# Patient Record
Sex: MALE | Race: WHITE | Employment: OTHER
[De-identification: names, ages, dates, MRNs, and addresses within clinical notes are randomized per-mention and may not be internally consistent; named-entity substitution may affect disease eponyms.]

---

## 2017-01-01 ENCOUNTER — HOME CARE VISIT (OUTPATIENT)
Dept: SCHEDULING | Facility: HOME HEALTH | Age: 82
End: 2017-01-01
Payer: MEDICARE

## 2017-01-01 ENCOUNTER — HOME CARE VISIT (OUTPATIENT)
Dept: HOSPICE | Facility: HOSPICE | Age: 82
End: 2017-01-01
Payer: MEDICARE

## 2017-01-01 ENCOUNTER — HOSPICE ADMISSION (OUTPATIENT)
Dept: HOSPICE | Facility: HOSPICE | Age: 82
End: 2017-01-01
Payer: MEDICARE

## 2017-01-01 ENCOUNTER — HOSPITAL ENCOUNTER (INPATIENT)
Age: 82
LOS: 6 days | Discharge: HOSPICE | DRG: 722 | End: 2017-10-12
Attending: EMERGENCY MEDICINE | Admitting: HOSPITALIST
Payer: MEDICARE

## 2017-01-01 ENCOUNTER — HOSPITAL ENCOUNTER (EMERGENCY)
Age: 82
Discharge: HOME | DRG: 722 | End: 2017-10-04
Attending: EMERGENCY MEDICINE
Payer: MEDICARE

## 2017-01-01 ENCOUNTER — HOSPITAL ENCOUNTER (EMERGENCY)
Age: 82
Discharge: HOME | DRG: 722 | End: 2017-10-05
Attending: EMERGENCY MEDICINE
Payer: MEDICARE

## 2017-01-01 ENCOUNTER — APPOINTMENT (OUTPATIENT)
Dept: CT IMAGING | Age: 82
DRG: 722 | End: 2017-01-01
Attending: EMERGENCY MEDICINE
Payer: MEDICARE

## 2017-01-01 VITALS
HEIGHT: 62 IN | BODY MASS INDEX: 26.5 KG/M2 | RESPIRATION RATE: 14 BRPM | DIASTOLIC BLOOD PRESSURE: 63 MMHG | RESPIRATION RATE: 16 BRPM | WEIGHT: 144 LBS | TEMPERATURE: 97.3 F | SYSTOLIC BLOOD PRESSURE: 117 MMHG | HEART RATE: 86 BPM | HEART RATE: 80 BPM | DIASTOLIC BLOOD PRESSURE: 64 MMHG | TEMPERATURE: 98.7 F | OXYGEN SATURATION: 96 % | SYSTOLIC BLOOD PRESSURE: 106 MMHG

## 2017-01-01 VITALS
HEART RATE: 78 BPM | RESPIRATION RATE: 16 BRPM | HEIGHT: 63 IN | TEMPERATURE: 98.6 F | BODY MASS INDEX: 28.35 KG/M2 | WEIGHT: 160 LBS | OXYGEN SATURATION: 100 % | SYSTOLIC BLOOD PRESSURE: 149 MMHG | DIASTOLIC BLOOD PRESSURE: 77 MMHG

## 2017-01-01 VITALS
DIASTOLIC BLOOD PRESSURE: 74 MMHG | TEMPERATURE: 98 F | SYSTOLIC BLOOD PRESSURE: 148 MMHG | RESPIRATION RATE: 16 BRPM | BODY MASS INDEX: 29.58 KG/M2 | OXYGEN SATURATION: 100 % | WEIGHT: 167 LBS | HEART RATE: 61 BPM

## 2017-01-01 VITALS
OXYGEN SATURATION: 95 % | WEIGHT: 144.62 LBS | HEART RATE: 56 BPM | TEMPERATURE: 98.1 F | HEIGHT: 62 IN | SYSTOLIC BLOOD PRESSURE: 130 MMHG | BODY MASS INDEX: 26.61 KG/M2 | RESPIRATION RATE: 15 BRPM | DIASTOLIC BLOOD PRESSURE: 72 MMHG

## 2017-01-01 VITALS
HEART RATE: 68 BPM | SYSTOLIC BLOOD PRESSURE: 122 MMHG | DIASTOLIC BLOOD PRESSURE: 64 MMHG | TEMPERATURE: 98 F | OXYGEN SATURATION: 98 % | RESPIRATION RATE: 20 BRPM

## 2017-01-01 DIAGNOSIS — R31.0 GROSS HEMATURIA: Primary | ICD-10-CM

## 2017-01-01 DIAGNOSIS — C61 PROSTATE CANCER (HCC): ICD-10-CM

## 2017-01-01 DIAGNOSIS — R41.82 ALTERED MENTAL STATUS, UNSPECIFIED ALTERED MENTAL STATUS TYPE: ICD-10-CM

## 2017-01-01 DIAGNOSIS — N40.2 PROSTATE NODULE: ICD-10-CM

## 2017-01-01 DIAGNOSIS — R31.9 HEMATURIA, UNSPECIFIED TYPE: ICD-10-CM

## 2017-01-01 DIAGNOSIS — T83.098A OBSTRUCTION OF URINARY CATHETER, INITIAL ENCOUNTER (HCC): Primary | ICD-10-CM

## 2017-01-01 DIAGNOSIS — R53.81 DEBILITY: ICD-10-CM

## 2017-01-01 LAB
ABO + RH BLD: NORMAL
ALBUMIN SERPL-MCNC: 2.8 G/DL (ref 3.4–5)
ALBUMIN SERPL-MCNC: 2.9 G/DL (ref 3.4–5)
ALBUMIN SERPL-MCNC: 3 G/DL (ref 3.4–5)
ALBUMIN SERPL-MCNC: 3.5 G/DL (ref 3.4–5)
ALBUMIN/GLOB SERPL: 0.7 {RATIO} (ref 0.8–1.7)
ALBUMIN/GLOB SERPL: 0.8 {RATIO} (ref 0.8–1.7)
ALBUMIN/GLOB SERPL: 1 {RATIO} (ref 0.8–1.7)
ALBUMIN/GLOB SERPL: 1 {RATIO} (ref 0.8–1.7)
ALP SERPL-CCNC: 58 U/L (ref 45–117)
ALP SERPL-CCNC: 65 U/L (ref 45–117)
ALP SERPL-CCNC: 67 U/L (ref 45–117)
ALP SERPL-CCNC: 73 U/L (ref 45–117)
ALT SERPL-CCNC: 12 U/L (ref 16–61)
ALT SERPL-CCNC: 14 U/L (ref 16–61)
ALT SERPL-CCNC: 20 U/L (ref 16–61)
ALT SERPL-CCNC: 22 U/L (ref 16–61)
ANION GAP BLD CALC-SCNC: 13 MMOL/L (ref 10–20)
ANION GAP SERPL CALC-SCNC: 11 MMOL/L (ref 3–18)
ANION GAP SERPL CALC-SCNC: 13 MMOL/L (ref 3–18)
ANION GAP SERPL CALC-SCNC: 7 MMOL/L (ref 3–18)
ANION GAP SERPL CALC-SCNC: 8 MMOL/L (ref 3–18)
ANION GAP SERPL CALC-SCNC: 8 MMOL/L (ref 3–18)
ANION GAP SERPL CALC-SCNC: 9 MMOL/L (ref 3–18)
APPEARANCE UR: ABNORMAL
APPEARANCE UR: ABNORMAL
APPEARANCE UR: CLEAR
APTT PPP: 29.8 SEC (ref 23–36.4)
APTT PPP: 30.3 SEC (ref 23–36.4)
AST SERPL-CCNC: 18 U/L (ref 15–37)
AST SERPL-CCNC: 20 U/L (ref 15–37)
AST SERPL-CCNC: 29 U/L (ref 15–37)
AST SERPL-CCNC: 33 U/L (ref 15–37)
ATRIAL RATE: 69 BPM
BACTERIA SPEC CULT: NORMAL
BACTERIA SPEC CULT: NORMAL
BACTERIA URNS QL MICRO: ABNORMAL /HPF
BASOPHILS # BLD: 0 K/UL (ref 0–0.06)
BASOPHILS NFR BLD: 0 % (ref 0–2)
BILIRUB SERPL-MCNC: 1 MG/DL (ref 0.2–1)
BILIRUB SERPL-MCNC: 1 MG/DL (ref 0.2–1)
BILIRUB SERPL-MCNC: 1.1 MG/DL (ref 0.2–1)
BILIRUB SERPL-MCNC: 1.4 MG/DL (ref 0.2–1)
BILIRUB UR QL: ABNORMAL
BILIRUB UR QL: NEGATIVE
BILIRUB UR QL: NEGATIVE
BLOOD GROUP ANTIBODIES SERPL: NORMAL
BUN BLD-MCNC: 27 MG/DL (ref 7–18)
BUN SERPL-MCNC: 21 MG/DL (ref 7–18)
BUN SERPL-MCNC: 25 MG/DL (ref 7–18)
BUN SERPL-MCNC: 27 MG/DL (ref 7–18)
BUN SERPL-MCNC: 28 MG/DL (ref 7–18)
BUN SERPL-MCNC: 31 MG/DL (ref 7–18)
BUN SERPL-MCNC: 34 MG/DL (ref 7–18)
BUN/CREAT SERPL: 21 (ref 12–20)
BUN/CREAT SERPL: 23 (ref 12–20)
BUN/CREAT SERPL: 25 (ref 12–20)
BUN/CREAT SERPL: 26 (ref 12–20)
BUN/CREAT SERPL: 28 (ref 12–20)
BUN/CREAT SERPL: 29 (ref 12–20)
CA-I BLD-MCNC: 1.12 MMOL/L (ref 1.12–1.32)
CALCIUM SERPL-MCNC: 8.4 MG/DL (ref 8.5–10.1)
CALCIUM SERPL-MCNC: 8.5 MG/DL (ref 8.5–10.1)
CALCIUM SERPL-MCNC: 8.6 MG/DL (ref 8.5–10.1)
CALCIUM SERPL-MCNC: 8.7 MG/DL (ref 8.5–10.1)
CALCIUM SERPL-MCNC: 8.8 MG/DL (ref 8.5–10.1)
CALCIUM SERPL-MCNC: 8.9 MG/DL (ref 8.5–10.1)
CALCULATED P AXIS, ECG09: 54 DEGREES
CALCULATED R AXIS, ECG10: 4 DEGREES
CALCULATED T AXIS, ECG11: 16 DEGREES
CHLORIDE BLD-SCNC: 108 MMOL/L (ref 100–108)
CHLORIDE SERPL-SCNC: 105 MMOL/L (ref 100–108)
CHLORIDE SERPL-SCNC: 105 MMOL/L (ref 100–108)
CHLORIDE SERPL-SCNC: 106 MMOL/L (ref 100–108)
CHLORIDE SERPL-SCNC: 108 MMOL/L (ref 100–108)
CO2 BLD-SCNC: 22 MMOL/L (ref 19–24)
CO2 SERPL-SCNC: 17 MMOL/L (ref 21–32)
CO2 SERPL-SCNC: 21 MMOL/L (ref 21–32)
CO2 SERPL-SCNC: 21 MMOL/L (ref 21–32)
CO2 SERPL-SCNC: 22 MMOL/L (ref 21–32)
CO2 SERPL-SCNC: 23 MMOL/L (ref 21–32)
CO2 SERPL-SCNC: 23 MMOL/L (ref 21–32)
COLOR UR: ABNORMAL
COLOR UR: ABNORMAL
COLOR UR: YELLOW
CREAT SERPL-MCNC: 0.82 MG/DL (ref 0.6–1.3)
CREAT SERPL-MCNC: 0.95 MG/DL (ref 0.6–1.3)
CREAT SERPL-MCNC: 1.07 MG/DL (ref 0.6–1.3)
CREAT SERPL-MCNC: 1.21 MG/DL (ref 0.6–1.3)
CREAT SERPL-MCNC: 1.23 MG/DL (ref 0.6–1.3)
CREAT SERPL-MCNC: 1.35 MG/DL (ref 0.6–1.3)
CREAT UR-MCNC: 1.4 MG/DL (ref 0.6–1.3)
DIAGNOSIS, 93000: NORMAL
DIFFERENTIAL METHOD BLD: ABNORMAL
EOSINOPHIL # BLD: 0.1 K/UL (ref 0–0.4)
EOSINOPHIL # BLD: 0.2 K/UL (ref 0–0.4)
EOSINOPHIL NFR BLD: 1 % (ref 0–5)
EOSINOPHIL NFR BLD: 2 % (ref 0–5)
EOSINOPHIL NFR BLD: 2 % (ref 0–5)
EPITH CASTS URNS QL MICRO: ABNORMAL /LPF (ref 0–5)
EPITH CASTS URNS QL MICRO: NEGATIVE /LPF (ref 0–5)
EPITH CASTS URNS QL MICRO: NEGATIVE /LPF (ref 0–5)
ERYTHROCYTE [DISTWIDTH] IN BLOOD BY AUTOMATED COUNT: 12.9 % (ref 11.6–14.5)
ERYTHROCYTE [DISTWIDTH] IN BLOOD BY AUTOMATED COUNT: 13.2 % (ref 11.6–14.5)
ERYTHROCYTE [DISTWIDTH] IN BLOOD BY AUTOMATED COUNT: 13.2 % (ref 11.6–14.5)
ERYTHROCYTE [DISTWIDTH] IN BLOOD BY AUTOMATED COUNT: 13.3 % (ref 11.6–14.5)
ERYTHROCYTE [DISTWIDTH] IN BLOOD BY AUTOMATED COUNT: 13.5 % (ref 11.6–14.5)
ERYTHROCYTE [DISTWIDTH] IN BLOOD BY AUTOMATED COUNT: 13.5 % (ref 11.6–14.5)
GLOBULIN SER CALC-MCNC: 3.1 G/DL (ref 2–4)
GLOBULIN SER CALC-MCNC: 3.4 G/DL (ref 2–4)
GLOBULIN SER CALC-MCNC: 3.4 G/DL (ref 2–4)
GLOBULIN SER CALC-MCNC: 3.9 G/DL (ref 2–4)
GLUCOSE BLD STRIP.AUTO-MCNC: 83 MG/DL (ref 74–106)
GLUCOSE SERPL-MCNC: 58 MG/DL (ref 74–99)
GLUCOSE SERPL-MCNC: 82 MG/DL (ref 74–99)
GLUCOSE SERPL-MCNC: 83 MG/DL (ref 74–99)
GLUCOSE SERPL-MCNC: 84 MG/DL (ref 74–99)
GLUCOSE SERPL-MCNC: 90 MG/DL (ref 74–99)
GLUCOSE SERPL-MCNC: 91 MG/DL (ref 74–99)
GLUCOSE UR STRIP.AUTO-MCNC: NEGATIVE MG/DL
HCT VFR BLD AUTO: 36.5 % (ref 36–48)
HCT VFR BLD AUTO: 38.6 % (ref 36–48)
HCT VFR BLD AUTO: 38.8 % (ref 36–48)
HCT VFR BLD AUTO: 39.2 % (ref 36–48)
HCT VFR BLD AUTO: 40.5 % (ref 36–48)
HCT VFR BLD AUTO: 40.5 % (ref 36–48)
HCT VFR BLD AUTO: 41.2 % (ref 36–48)
HCT VFR BLD CALC: 39 % (ref 36–49)
HGB BLD-MCNC: 12.4 G/DL (ref 13–16)
HGB BLD-MCNC: 13.3 G/DL (ref 12–16)
HGB BLD-MCNC: 13.6 G/DL (ref 13–16)
HGB BLD-MCNC: 13.7 G/DL (ref 13–16)
HGB BLD-MCNC: 13.7 G/DL (ref 13–16)
HGB BLD-MCNC: 14 G/DL (ref 13–16)
HGB BLD-MCNC: 14.1 G/DL (ref 13–16)
HGB BLD-MCNC: 14.2 G/DL (ref 13–16)
HGB UR QL STRIP: ABNORMAL
INR PPP: 1.2 (ref 0.8–1.2)
INR PPP: 1.2 (ref 0.8–1.2)
KETONES UR QL STRIP.AUTO: 15 MG/DL
KETONES UR QL STRIP.AUTO: NEGATIVE MG/DL
KETONES UR QL STRIP.AUTO: NEGATIVE MG/DL
LACTATE BLD-SCNC: 1.4 MMOL/L (ref 0.4–2)
LEUKOCYTE ESTERASE UR QL STRIP.AUTO: ABNORMAL
LYMPHOCYTES # BLD: 1 K/UL (ref 0.9–3.6)
LYMPHOCYTES # BLD: 1.2 K/UL (ref 0.9–3.6)
LYMPHOCYTES # BLD: 1.2 K/UL (ref 0.9–3.6)
LYMPHOCYTES # BLD: 1.3 K/UL (ref 0.9–3.6)
LYMPHOCYTES # BLD: 1.4 K/UL (ref 0.9–3.6)
LYMPHOCYTES # BLD: 1.7 K/UL (ref 0.9–3.6)
LYMPHOCYTES NFR BLD: 11 % (ref 21–52)
LYMPHOCYTES NFR BLD: 14 % (ref 21–52)
LYMPHOCYTES NFR BLD: 16 % (ref 21–52)
LYMPHOCYTES NFR BLD: 17 % (ref 21–52)
LYMPHOCYTES NFR BLD: 18 % (ref 21–52)
LYMPHOCYTES NFR BLD: 9 % (ref 21–52)
MCH RBC QN AUTO: 33.5 PG (ref 24–34)
MCH RBC QN AUTO: 33.7 PG (ref 24–34)
MCH RBC QN AUTO: 33.8 PG (ref 24–34)
MCH RBC QN AUTO: 33.8 PG (ref 24–34)
MCH RBC QN AUTO: 34.1 PG (ref 24–34)
MCH RBC QN AUTO: 34.1 PG (ref 24–34)
MCHC RBC AUTO-ENTMCNC: 34 G/DL (ref 31–37)
MCHC RBC AUTO-ENTMCNC: 34.5 G/DL (ref 31–37)
MCHC RBC AUTO-ENTMCNC: 34.6 G/DL (ref 31–37)
MCHC RBC AUTO-ENTMCNC: 34.8 G/DL (ref 31–37)
MCHC RBC AUTO-ENTMCNC: 34.9 G/DL (ref 31–37)
MCHC RBC AUTO-ENTMCNC: 35.5 G/DL (ref 31–37)
MCV RBC AUTO: 95.1 FL (ref 74–97)
MCV RBC AUTO: 97.1 FL (ref 74–97)
MCV RBC AUTO: 97.5 FL (ref 74–97)
MCV RBC AUTO: 98.1 FL (ref 74–97)
MCV RBC AUTO: 98.5 FL (ref 74–97)
MCV RBC AUTO: 98.6 FL (ref 74–97)
MONOCYTES # BLD: 0.6 K/UL (ref 0.05–1.2)
MONOCYTES # BLD: 0.8 K/UL (ref 0.05–1.2)
MONOCYTES # BLD: 1.1 K/UL (ref 0.05–1.2)
MONOCYTES # BLD: 1.1 K/UL (ref 0.05–1.2)
MONOCYTES # BLD: 1.2 K/UL (ref 0.05–1.2)
MONOCYTES # BLD: 1.3 K/UL (ref 0.05–1.2)
MONOCYTES NFR BLD: 10 % (ref 3–10)
MONOCYTES NFR BLD: 10 % (ref 3–10)
MONOCYTES NFR BLD: 11 % (ref 3–10)
MONOCYTES NFR BLD: 12 % (ref 3–10)
MONOCYTES NFR BLD: 12 % (ref 3–10)
MONOCYTES NFR BLD: 7 % (ref 3–10)
NEUTS SEG # BLD: 5.7 K/UL (ref 1.8–8)
NEUTS SEG # BLD: 5.8 K/UL (ref 1.8–8)
NEUTS SEG # BLD: 6.8 K/UL (ref 1.8–8)
NEUTS SEG # BLD: 7 K/UL (ref 1.8–8)
NEUTS SEG # BLD: 8.7 K/UL (ref 1.8–8)
NEUTS SEG # BLD: 9.1 K/UL (ref 1.8–8)
NEUTS SEG NFR BLD: 69 % (ref 40–73)
NEUTS SEG NFR BLD: 72 % (ref 40–73)
NEUTS SEG NFR BLD: 73 % (ref 40–73)
NEUTS SEG NFR BLD: 74 % (ref 40–73)
NEUTS SEG NFR BLD: 77 % (ref 40–73)
NEUTS SEG NFR BLD: 80 % (ref 40–73)
NITRITE UR QL STRIP.AUTO: NEGATIVE
NITRITE UR QL STRIP.AUTO: NEGATIVE
NITRITE UR QL STRIP.AUTO: POSITIVE
P-R INTERVAL, ECG05: 308 MS
PH UR STRIP: 5.5 [PH] (ref 5–8)
PLATELET # BLD AUTO: 145 K/UL (ref 135–420)
PLATELET # BLD AUTO: 151 K/UL (ref 135–420)
PLATELET # BLD AUTO: 158 K/UL (ref 135–420)
PLATELET # BLD AUTO: 198 K/UL (ref 135–420)
PLATELET # BLD AUTO: 202 K/UL (ref 135–420)
PLATELET # BLD AUTO: 224 K/UL (ref 135–420)
PMV BLD AUTO: 11.9 FL (ref 9.2–11.8)
PMV BLD AUTO: 12 FL (ref 9.2–11.8)
PMV BLD AUTO: 12.2 FL (ref 9.2–11.8)
PMV BLD AUTO: 12.6 FL (ref 9.2–11.8)
PMV BLD AUTO: 12.6 FL (ref 9.2–11.8)
PMV BLD AUTO: 12.8 FL (ref 9.2–11.8)
POTASSIUM BLD-SCNC: 4.8 MMOL/L (ref 3.5–5.5)
POTASSIUM SERPL-SCNC: 3.6 MMOL/L (ref 3.5–5.5)
POTASSIUM SERPL-SCNC: 3.7 MMOL/L (ref 3.5–5.5)
POTASSIUM SERPL-SCNC: 3.7 MMOL/L (ref 3.5–5.5)
POTASSIUM SERPL-SCNC: 3.8 MMOL/L (ref 3.5–5.5)
POTASSIUM SERPL-SCNC: 3.9 MMOL/L (ref 3.5–5.5)
POTASSIUM SERPL-SCNC: 4.2 MMOL/L (ref 3.5–5.5)
PROT SERPL-MCNC: 6.1 G/DL (ref 6.4–8.2)
PROT SERPL-MCNC: 6.2 G/DL (ref 6.4–8.2)
PROT SERPL-MCNC: 6.8 G/DL (ref 6.4–8.2)
PROT SERPL-MCNC: 6.9 G/DL (ref 6.4–8.2)
PROT UR STRIP-MCNC: 100 MG/DL
PROT UR STRIP-MCNC: >1000 MG/DL
PROT UR STRIP-MCNC: >1000 MG/DL
PROTHROMBIN TIME: 14.4 SEC (ref 11.5–15.2)
PROTHROMBIN TIME: 15 SEC (ref 11.5–15.2)
PSA SERPL-MCNC: 231 NG/ML (ref 0–4)
Q-T INTERVAL, ECG07: 404 MS
QRS DURATION, ECG06: 108 MS
QTC CALCULATION (BEZET), ECG08: 432 MS
RBC # BLD AUTO: 3.7 M/UL (ref 4.7–5.5)
RBC # BLD AUTO: 4.02 M/UL (ref 4.7–5.5)
RBC # BLD AUTO: 4.06 M/UL (ref 4.7–5.5)
RBC # BLD AUTO: 4.11 M/UL (ref 4.7–5.5)
RBC # BLD AUTO: 4.17 M/UL (ref 4.7–5.5)
RBC # BLD AUTO: 4.2 M/UL (ref 4.7–5.5)
RBC #/AREA URNS HPF: ABNORMAL /HPF (ref 0–5)
SERVICE CMNT-IMP: NORMAL
SERVICE CMNT-IMP: NORMAL
SODIUM BLD-SCNC: 138 MMOL/L (ref 136–145)
SODIUM SERPL-SCNC: 136 MMOL/L (ref 136–145)
SODIUM SERPL-SCNC: 137 MMOL/L (ref 136–145)
SODIUM SERPL-SCNC: 138 MMOL/L (ref 136–145)
SODIUM SERPL-SCNC: 138 MMOL/L (ref 136–145)
SP GR UR REFRACTOMETRY: 1.02 (ref 1–1.03)
SP GR UR REFRACTOMETRY: 1.03 (ref 1–1.03)
SP GR UR REFRACTOMETRY: 1.03 (ref 1–1.03)
SPECIMEN EXP DATE BLD: NORMAL
UROBILINOGEN UR QL STRIP.AUTO: 0.2 EU/DL (ref 0.2–1)
UROBILINOGEN UR QL STRIP.AUTO: 1 EU/DL (ref 0.2–1)
UROBILINOGEN UR QL STRIP.AUTO: 1 EU/DL (ref 0.2–1)
VENTRICULAR RATE, ECG03: 69 BPM
WBC # BLD AUTO: 10.9 K/UL (ref 4.6–13.2)
WBC # BLD AUTO: 11.7 K/UL (ref 4.6–13.2)
WBC # BLD AUTO: 7.8 K/UL (ref 4.6–13.2)
WBC # BLD AUTO: 7.9 K/UL (ref 4.6–13.2)
WBC # BLD AUTO: 9.5 K/UL (ref 4.6–13.2)
WBC # BLD AUTO: 9.9 K/UL (ref 4.6–13.2)
WBC URNS QL MICRO: ABNORMAL /HPF (ref 0–4)

## 2017-01-01 PROCEDURE — 84153 ASSAY OF PSA TOTAL: CPT | Performed by: UROLOGY

## 2017-01-01 PROCEDURE — HHS10554 SHAMPOO/BODY WASH 8 OZ ALOE VESTA

## 2017-01-01 PROCEDURE — G0156 HHCP-SVS OF AIDE,EA 15 MIN: HCPCS

## 2017-01-01 PROCEDURE — 74011250636 HC RX REV CODE- 250/636: Performed by: HOSPITALIST

## 2017-01-01 PROCEDURE — A9270 NON-COVERED ITEM OR SERVICE: HCPCS

## 2017-01-01 PROCEDURE — 74011250637 HC RX REV CODE- 250/637: Performed by: HOSPITALIST

## 2017-01-01 PROCEDURE — 0651 HSPC ROUTINE HOME CARE

## 2017-01-01 PROCEDURE — 77030018836 HC SOL IRR NACL ICUM -A

## 2017-01-01 PROCEDURE — A4927 NON-STERILE GLOVES: HCPCS

## 2017-01-01 PROCEDURE — G0155 HHCP-SVS OF CSW,EA 15 MIN: HCPCS

## 2017-01-01 PROCEDURE — A4351 STRAIGHT TIP URINE CATHETER: HCPCS

## 2017-01-01 PROCEDURE — T4541 LARGE DISPOSABLE UNDERPAD: HCPCS

## 2017-01-01 PROCEDURE — 65270000029 HC RM PRIVATE

## 2017-01-01 PROCEDURE — 87086 URINE CULTURE/COLONY COUNT: CPT | Performed by: EMERGENCY MEDICINE

## 2017-01-01 PROCEDURE — 85025 COMPLETE CBC W/AUTO DIFF WBC: CPT | Performed by: HOSPITALIST

## 2017-01-01 PROCEDURE — G0299 HHS/HOSPICE OF RN EA 15 MIN: HCPCS

## 2017-01-01 PROCEDURE — 77030011251 HC DRSG HYDRCOIL S&N -A

## 2017-01-01 PROCEDURE — 81001 URINALYSIS AUTO W/SCOPE: CPT | Performed by: EMERGENCY MEDICINE

## 2017-01-01 PROCEDURE — 77030020263 HC SOL INJ SOD CL0.9% LFCR 1000ML

## 2017-01-01 PROCEDURE — 77030005206

## 2017-01-01 PROCEDURE — 36415 COLL VENOUS BLD VENIPUNCTURE: CPT | Performed by: HOSPITALIST

## 2017-01-01 PROCEDURE — 74011250637 HC RX REV CODE- 250/637: Performed by: INTERNAL MEDICINE

## 2017-01-01 PROCEDURE — 77030011943

## 2017-01-01 PROCEDURE — T4523 ADULT SIZE BRIEF/DIAPER LG: HCPCS

## 2017-01-01 PROCEDURE — 51798 US URINE CAPACITY MEASURE: CPT

## 2017-01-01 PROCEDURE — A6250 SKIN SEAL PROTECT MOISTURIZR: HCPCS

## 2017-01-01 PROCEDURE — 80048 BASIC METABOLIC PNL TOTAL CA: CPT | Performed by: EMERGENCY MEDICINE

## 2017-01-01 PROCEDURE — 80053 COMPREHEN METABOLIC PANEL: CPT | Performed by: HOSPITALIST

## 2017-01-01 PROCEDURE — 77030012961 HC IRR KT CYSTO/TUR ICUM -A

## 2017-01-01 PROCEDURE — 85610 PROTHROMBIN TIME: CPT | Performed by: EMERGENCY MEDICINE

## 2017-01-01 PROCEDURE — G0300 HHS/HOSPICE OF LPN EA 15 MIN: HCPCS

## 2017-01-01 PROCEDURE — 85018 HEMOGLOBIN: CPT | Performed by: HOSPITALIST

## 2017-01-01 PROCEDURE — 74011250637 HC RX REV CODE- 250/637: Performed by: UROLOGY

## 2017-01-01 PROCEDURE — 77030005546 HC CATH URETH FOL 3W BARD -A

## 2017-01-01 PROCEDURE — 74011000250 HC RX REV CODE- 250: Performed by: HOSPITALIST

## 2017-01-01 PROCEDURE — 85025 COMPLETE CBC W/AUTO DIFF WBC: CPT | Performed by: EMERGENCY MEDICINE

## 2017-01-01 PROCEDURE — 74011250636 HC RX REV CODE- 250/636: Performed by: EMERGENCY MEDICINE

## 2017-01-01 PROCEDURE — 77010033678 HC OXYGEN DAILY

## 2017-01-01 PROCEDURE — HOSPICE MEDICATION HC HH HOSPICE MEDICATION

## 2017-01-01 PROCEDURE — 99284 EMERGENCY DEPT VISIT MOD MDM: CPT

## 2017-01-01 PROCEDURE — 36415 COLL VENOUS BLD VENIPUNCTURE: CPT | Performed by: UROLOGY

## 2017-01-01 PROCEDURE — A4353 INTERMITTENT URINARY CATH: HCPCS

## 2017-01-01 PROCEDURE — 77030020255 HC SOL INJ LR 1000ML BG

## 2017-01-01 PROCEDURE — 76450000000

## 2017-01-01 PROCEDURE — 77030034849

## 2017-01-01 PROCEDURE — 85730 THROMBOPLASTIN TIME PARTIAL: CPT | Performed by: EMERGENCY MEDICINE

## 2017-01-01 PROCEDURE — 96365 THER/PROPH/DIAG IV INF INIT: CPT

## 2017-01-01 PROCEDURE — 3336500001 HSPC ELECTION

## 2017-01-01 RX ORDER — TIMOLOL MALEATE 6.8 MG/ML
1 SOLUTION/ DROPS OPHTHALMIC 2 TIMES DAILY
COMMUNITY

## 2017-01-01 RX ORDER — LIDOCAINE 50 MG/G
1 PATCH TOPICAL EVERY 24 HOURS
Status: DISCONTINUED | OUTPATIENT
Start: 2017-01-01 | End: 2017-01-01

## 2017-01-01 RX ORDER — BIMATOPROST 0.3 MG/ML
1 SOLUTION/ DROPS OPHTHALMIC EVERY EVENING
Status: DISCONTINUED | OUTPATIENT
Start: 2017-01-01 | End: 2017-01-01 | Stop reason: CLARIF

## 2017-01-01 RX ORDER — ONDANSETRON 2 MG/ML
4 INJECTION INTRAMUSCULAR; INTRAVENOUS
Status: DISCONTINUED | OUTPATIENT
Start: 2017-01-01 | End: 2017-01-01 | Stop reason: HOSPADM

## 2017-01-01 RX ORDER — DUTASTERIDE 0.5 MG/1
0.5 CAPSULE, LIQUID FILLED ORAL DAILY
Status: DISCONTINUED | OUTPATIENT
Start: 2017-01-01 | End: 2017-01-01 | Stop reason: HOSPADM

## 2017-01-01 RX ORDER — SODIUM CHLORIDE 9 MG/ML
100 INJECTION, SOLUTION INTRAVENOUS CONTINUOUS
Status: DISCONTINUED | OUTPATIENT
Start: 2017-01-01 | End: 2017-01-01 | Stop reason: HOSPADM

## 2017-01-01 RX ORDER — SODIUM CHLORIDE 9 MG/ML
50 INJECTION, SOLUTION INTRAVENOUS CONTINUOUS
Status: DISCONTINUED | OUTPATIENT
Start: 2017-01-01 | End: 2017-01-01 | Stop reason: HOSPADM

## 2017-01-01 RX ORDER — LATANOPROST 50 UG/ML
1 SOLUTION/ DROPS OPHTHALMIC EVERY EVENING
Status: DISCONTINUED | OUTPATIENT
Start: 2017-01-01 | End: 2017-01-01 | Stop reason: SDUPTHER

## 2017-01-01 RX ORDER — MORPHINE SULFATE 2 MG/ML
1 INJECTION, SOLUTION INTRAMUSCULAR; INTRAVENOUS
Status: DISCONTINUED | OUTPATIENT
Start: 2017-01-01 | End: 2017-01-01 | Stop reason: HOSPADM

## 2017-01-01 RX ORDER — TAMSULOSIN HYDROCHLORIDE 0.4 MG/1
0.4 CAPSULE ORAL DAILY
Status: DISCONTINUED | OUTPATIENT
Start: 2017-01-01 | End: 2017-01-01 | Stop reason: HOSPADM

## 2017-01-01 RX ORDER — CEPHALEXIN 500 MG/1
500 CAPSULE ORAL 4 TIMES DAILY
Qty: 28 CAP | Refills: 0 | Status: SHIPPED | OUTPATIENT
Start: 2017-01-01 | End: 2017-01-01

## 2017-01-01 RX ORDER — LEVOFLOXACIN 5 MG/ML
500 INJECTION, SOLUTION INTRAVENOUS
Status: COMPLETED | OUTPATIENT
Start: 2017-01-01 | End: 2017-01-01

## 2017-01-01 RX ORDER — TAMSULOSIN HYDROCHLORIDE 0.4 MG/1
0.4 CAPSULE ORAL DAILY
Qty: 30 CAP | Refills: 0 | Status: SHIPPED | OUTPATIENT
Start: 2017-01-01

## 2017-01-01 RX ORDER — QUETIAPINE FUMARATE 25 MG/1
12.5 TABLET, FILM COATED ORAL
Qty: 30 TAB | Refills: 0 | Status: SHIPPED | OUTPATIENT
Start: 2017-01-01

## 2017-01-01 RX ORDER — DORZOLAMIDE HYDROCHLORIDE AND TIMOLOL MALEATE 20; 5 MG/ML; MG/ML
1 SOLUTION/ DROPS OPHTHALMIC 2 TIMES DAILY
Status: DISCONTINUED | OUTPATIENT
Start: 2017-01-01 | End: 2017-01-01

## 2017-01-01 RX ORDER — LIDOCAINE 50 MG/G
PATCH TOPICAL
Qty: 7 EACH | Refills: 0 | Status: SHIPPED | OUTPATIENT
Start: 2017-01-01

## 2017-01-01 RX ORDER — AMLODIPINE BESYLATE 5 MG/1
2.5 TABLET ORAL DAILY
Status: DISCONTINUED | OUTPATIENT
Start: 2017-01-01 | End: 2017-01-01 | Stop reason: HOSPADM

## 2017-01-01 RX ORDER — QUETIAPINE FUMARATE 25 MG/1
12.5 TABLET, FILM COATED ORAL
Status: DISCONTINUED | OUTPATIENT
Start: 2017-01-01 | End: 2017-01-01 | Stop reason: HOSPADM

## 2017-01-01 RX ORDER — CEFTRIAXONE 1 G/1
1 INJECTION, POWDER, FOR SOLUTION INTRAMUSCULAR; INTRAVENOUS
Status: DISCONTINUED | OUTPATIENT
Start: 2017-01-01 | End: 2017-01-01

## 2017-01-01 RX ORDER — HYDROCODONE BITARTRATE AND ACETAMINOPHEN 5; 325 MG/1; MG/1
1 TABLET ORAL
Status: DISCONTINUED | OUTPATIENT
Start: 2017-01-01 | End: 2017-01-01 | Stop reason: HOSPADM

## 2017-01-01 RX ORDER — ACETAMINOPHEN 325 MG/1
650 TABLET ORAL
Status: DISCONTINUED | OUTPATIENT
Start: 2017-01-01 | End: 2017-01-01 | Stop reason: HOSPADM

## 2017-01-01 RX ORDER — HYDROCHLOROTHIAZIDE 12.5 MG/1
12.5 CAPSULE ORAL DAILY
Status: DISCONTINUED | OUTPATIENT
Start: 2017-01-01 | End: 2017-01-01 | Stop reason: HOSPADM

## 2017-01-01 RX ORDER — LIDOCAINE 50 MG/G
1 PATCH TOPICAL EVERY 24 HOURS
Status: DISCONTINUED | OUTPATIENT
Start: 2017-01-01 | End: 2017-01-01 | Stop reason: HOSPADM

## 2017-01-01 RX ORDER — DORZOLAMIDE HYDROCHLORIDE AND TIMOLOL MALEATE 20; 5 MG/ML; MG/ML
1 SOLUTION/ DROPS OPHTHALMIC 2 TIMES DAILY
Status: DISCONTINUED | OUTPATIENT
Start: 2017-01-01 | End: 2017-01-01 | Stop reason: HOSPADM

## 2017-01-01 RX ORDER — DUTASTERIDE 0.5 MG/1
0.5 CAPSULE, LIQUID FILLED ORAL DAILY
Qty: 30 CAP | Refills: 0 | Status: SHIPPED | OUTPATIENT
Start: 2017-01-01

## 2017-01-01 RX ORDER — HYDROCODONE BITARTRATE AND ACETAMINOPHEN 5; 325 MG/1; MG/1
1 TABLET ORAL
Qty: 42 TAB | Refills: 0 | Status: SHIPPED | OUTPATIENT
Start: 2017-01-01

## 2017-01-01 RX ORDER — LORAZEPAM 2 MG/ML
0.25 INJECTION INTRAMUSCULAR ONCE
Status: COMPLETED | OUTPATIENT
Start: 2017-01-01 | End: 2017-01-01

## 2017-01-01 RX ORDER — ONDANSETRON 4 MG/1
4 TABLET, ORALLY DISINTEGRATING ORAL
Qty: 42 TAB | Refills: 0 | Status: SHIPPED | OUTPATIENT
Start: 2017-01-01

## 2017-01-01 RX ORDER — LIDOCAINE HYDROCHLORIDE 20 MG/ML
JELLY TOPICAL ONCE
Status: COMPLETED | OUTPATIENT
Start: 2017-01-01 | End: 2017-01-01

## 2017-01-01 RX ORDER — LISINOPRIL 20 MG/1
20 TABLET ORAL DAILY
Status: DISCONTINUED | OUTPATIENT
Start: 2017-01-01 | End: 2017-01-01 | Stop reason: HOSPADM

## 2017-01-01 RX ORDER — SODIUM CHLORIDE 9 MG/ML
INJECTION, SOLUTION INTRAVENOUS
Status: DISPENSED
Start: 2017-01-01 | End: 2017-01-01

## 2017-01-01 RX ADMIN — AMLODIPINE BESYLATE 2.5 MG: 5 TABLET ORAL at 08:23

## 2017-01-01 RX ADMIN — AMLODIPINE BESYLATE 2.5 MG: 5 TABLET ORAL at 09:08

## 2017-01-01 RX ADMIN — LATANOPROST 1 DROP: 50 SOLUTION OPHTHALMIC at 21:17

## 2017-01-01 RX ADMIN — DORZOLAMIDE HYDROCHLORIDE AND TIMOLOL MALEATE 1 DROP: 20; 5 SOLUTION/ DROPS OPHTHALMIC at 09:19

## 2017-01-01 RX ADMIN — DORZOLAMIDE HYDROCHLORIDE AND TIMOLOL MALEATE 1 DROP: 20; 5 SOLUTION/ DROPS OPHTHALMIC at 18:29

## 2017-01-01 RX ADMIN — SODIUM CHLORIDE 100 ML/HR: 900 INJECTION, SOLUTION INTRAVENOUS at 10:04

## 2017-01-01 RX ADMIN — AMLODIPINE BESYLATE 2.5 MG: 5 TABLET ORAL at 08:43

## 2017-01-01 RX ADMIN — LIDOCAINE HYDROCHLORIDE: 20 JELLY TOPICAL at 19:20

## 2017-01-01 RX ADMIN — DORZOLAMIDE HYDROCHLORIDE AND TIMOLOL MALEATE 1 DROP: 20; 5 SOLUTION/ DROPS OPHTHALMIC at 18:26

## 2017-01-01 RX ADMIN — SODIUM CHLORIDE 50 ML/HR: 900 INJECTION, SOLUTION INTRAVENOUS at 15:17

## 2017-01-01 RX ADMIN — DORZOLAMIDE HYDROCHLORIDE AND TIMOLOL MALEATE 1 DROP: 20; 5 SOLUTION/ DROPS OPHTHALMIC at 09:11

## 2017-01-01 RX ADMIN — DORZOLAMIDE HYDROCHLORIDE AND TIMOLOL MALEATE 1 DROP: 20; 5 SOLUTION/ DROPS OPHTHALMIC at 09:56

## 2017-01-01 RX ADMIN — SODIUM CHLORIDE 50 ML/HR: 900 INJECTION, SOLUTION INTRAVENOUS at 09:36

## 2017-01-01 RX ADMIN — DORZOLAMIDE HYDROCHLORIDE AND TIMOLOL MALEATE 1 DROP: 20; 5 SOLUTION/ DROPS OPHTHALMIC at 18:28

## 2017-01-01 RX ADMIN — HYDROCHLOROTHIAZIDE 12.5 MG: 12.5 CAPSULE ORAL at 08:54

## 2017-01-01 RX ADMIN — AMLODIPINE BESYLATE 2.5 MG: 5 TABLET ORAL at 08:54

## 2017-01-01 RX ADMIN — DORZOLAMIDE HYDROCHLORIDE AND TIMOLOL MALEATE 1 DROP: 20; 5 SOLUTION/ DROPS OPHTHALMIC at 15:02

## 2017-01-01 RX ADMIN — HYDROCODONE BITARTRATE AND ACETAMINOPHEN 1 TABLET: 5; 325 TABLET ORAL at 01:11

## 2017-01-01 RX ADMIN — ACETAMINOPHEN 650 MG: 325 TABLET, FILM COATED ORAL at 06:14

## 2017-01-01 RX ADMIN — HYDROCHLOROTHIAZIDE 12.5 MG: 12.5 CAPSULE ORAL at 09:08

## 2017-01-01 RX ADMIN — LISINOPRIL 20 MG: 20 TABLET ORAL at 08:43

## 2017-01-01 RX ADMIN — LATANOPROST 1 DROP: 50 SOLUTION OPHTHALMIC at 18:26

## 2017-01-01 RX ADMIN — LORAZEPAM 0.26 MG: 2 INJECTION INTRAMUSCULAR; INTRAVENOUS at 14:34

## 2017-01-01 RX ADMIN — DORZOLAMIDE HYDROCHLORIDE AND TIMOLOL MALEATE 1 DROP: 20; 5 SOLUTION/ DROPS OPHTHALMIC at 08:54

## 2017-01-01 RX ADMIN — ACETAMINOPHEN 650 MG: 325 TABLET, FILM COATED ORAL at 08:24

## 2017-01-01 RX ADMIN — IOPAMIDOL 90 ML: 612 INJECTION, SOLUTION INTRAVENOUS at 16:09

## 2017-01-01 RX ADMIN — SODIUM CHLORIDE 50 ML/HR: 900 INJECTION, SOLUTION INTRAVENOUS at 20:15

## 2017-01-01 RX ADMIN — DUTASTERIDE 0.5 MG: 0.5 CAPSULE, LIQUID FILLED ORAL at 09:20

## 2017-01-01 RX ADMIN — TAMSULOSIN HYDROCHLORIDE 0.4 MG: 0.4 CAPSULE ORAL at 20:48

## 2017-01-01 RX ADMIN — DORZOLAMIDE HYDROCHLORIDE AND TIMOLOL MALEATE 1 DROP: 20; 5 SOLUTION/ DROPS OPHTHALMIC at 09:00

## 2017-01-01 RX ADMIN — ACETAMINOPHEN 650 MG: 325 TABLET, FILM COATED ORAL at 05:18

## 2017-01-01 RX ADMIN — SODIUM CHLORIDE 50 ML/HR: 900 INJECTION, SOLUTION INTRAVENOUS at 17:00

## 2017-01-01 RX ADMIN — DUTASTERIDE 0.5 MG: 0.5 CAPSULE, LIQUID FILLED ORAL at 15:39

## 2017-01-01 RX ADMIN — DUTASTERIDE 0.5 MG: 0.5 CAPSULE, LIQUID FILLED ORAL at 08:54

## 2017-01-01 RX ADMIN — LISINOPRIL 20 MG: 20 TABLET ORAL at 08:53

## 2017-01-01 RX ADMIN — HYDROCODONE BITARTRATE AND ACETAMINOPHEN 1 TABLET: 5; 325 TABLET ORAL at 20:42

## 2017-01-01 RX ADMIN — TAMSULOSIN HYDROCHLORIDE 0.4 MG: 0.4 CAPSULE ORAL at 08:43

## 2017-01-01 RX ADMIN — HYDROCHLOROTHIAZIDE 12.5 MG: 12.5 CAPSULE ORAL at 08:23

## 2017-01-01 RX ADMIN — LISINOPRIL 20 MG: 20 TABLET ORAL at 08:23

## 2017-01-01 RX ADMIN — SODIUM CHLORIDE 50 ML/HR: 900 INJECTION, SOLUTION INTRAVENOUS at 05:44

## 2017-01-01 RX ADMIN — QUETIAPINE FUMARATE 12.5 MG: 25 TABLET, FILM COATED ORAL at 21:04

## 2017-01-01 RX ADMIN — LEVOFLOXACIN 500 MG: 500 INJECTION, SOLUTION INTRAVENOUS at 11:08

## 2017-01-01 RX ADMIN — TAMSULOSIN HYDROCHLORIDE 0.4 MG: 0.4 CAPSULE ORAL at 08:54

## 2017-01-01 RX ADMIN — SODIUM CHLORIDE 500 ML: 900 INJECTION, SOLUTION INTRAVENOUS at 15:11

## 2017-01-01 RX ADMIN — SODIUM CHLORIDE 100 ML/HR: 900 INJECTION, SOLUTION INTRAVENOUS at 15:18

## 2017-01-01 RX ADMIN — HYDROCHLOROTHIAZIDE 12.5 MG: 12.5 CAPSULE ORAL at 08:43

## 2017-01-01 RX ADMIN — LISINOPRIL 20 MG: 20 TABLET ORAL at 09:08

## 2017-01-01 RX ADMIN — CEFTRIAXONE 1 G: 1 INJECTION, POWDER, FOR SOLUTION INTRAMUSCULAR; INTRAVENOUS at 15:17

## 2017-01-01 RX ADMIN — DORZOLAMIDE HYDROCHLORIDE AND TIMOLOL MALEATE 1 DROP: 20; 5 SOLUTION/ DROPS OPHTHALMIC at 19:05

## 2017-01-01 RX ADMIN — HYDROCODONE BITARTRATE AND ACETAMINOPHEN 1 TABLET: 5; 325 TABLET ORAL at 23:57

## 2017-01-01 RX ADMIN — ACETAMINOPHEN 650 MG: 325 TABLET, FILM COATED ORAL at 22:44

## 2017-10-04 NOTE — DISCHARGE INSTRUCTIONS

## 2017-10-04 NOTE — ED PROVIDER NOTES
HPI Comments: Houston Clark is a 80 y.o. male who presents to the ED c/o hematuria starting today. Associated sx include dysuria, flank pain and abdominal pain. Pt denies fever and vomiting. Pt is a former smoker. Pt has hx of bladder retention. The history is provided by the patient and a relative. Past Medical History:   Diagnosis Date    Bladder retention     Cancer ([address]     Elevated PSA     Glaucoma     Hypertension     Macular degeneration     Mumps        Past Surgical History:   Procedure Laterality Date    HX APPENDECTOMY  1940s    HX CATARACT REMOVAL  1990s         Family History:   Problem Relation Age of Onset    Cancer Mother     Hypertension Daughter     Migraines Son        Social History     Social History    Marital status:      Spouse name: N/A    Number of children: N/A    Years of education: N/A     Occupational History    Not on file. Social History Main Topics    Smoking status: Former Smoker     Packs/day: 3.00     Years: 30.00     Types: Cigarettes     Quit date: 1/1/1969    Smokeless tobacco: Not on file    Alcohol use Yes    Drug use: No    Sexual activity: Not on file     Other Topics Concern    Not on file     Social History Narrative         ALLERGIES: Aspirin    Review of Systems   Constitutional: Negative for diaphoresis and fever. HENT: Negative for congestion and sore throat. Eyes: Negative for pain and itching. Respiratory: Negative for cough and shortness of breath. Cardiovascular: Negative for chest pain and palpitations. Gastrointestinal: Positive for abdominal pain. Negative for diarrhea, nausea and vomiting. Endocrine: Negative for polydipsia and polyuria. Genitourinary: Positive for dysuria, flank pain and hematuria. Musculoskeletal: Negative for arthralgias and myalgias. Skin: Negative for rash and wound. Neurological: Negative for seizures and syncope. Hematological: Does not bruise/bleed easily. Psychiatric/Behavioral: Negative for agitation and hallucinations. Vitals:    10/04/17 1500 10/04/17 1515 10/04/17 1530 10/04/17 1707   BP: 148/67 129/66 148/56    Pulse: 69 70 70    Resp: 17 20 19    Temp:       SpO2: 95% 94% 95% 99%   Weight:       Height:                Physical Exam   Constitutional: He is oriented to person, place, and time. He appears well-developed and well-nourished. No distress. HENT:   Head: Normocephalic and atraumatic. Mouth/Throat: Oropharynx is clear and moist.   Eyes: Conjunctivae and EOM are normal. Pupils are equal, round, and reactive to light. No scleral icterus. Neck: Normal range of motion. Neck supple. Cardiovascular: Normal rate, regular rhythm and normal heart sounds. No murmur heard. Pulmonary/Chest: Effort normal and breath sounds normal. No respiratory distress. Abdominal: Soft. Bowel sounds are normal. He exhibits no distension. There is no tenderness. Mild CVA tenderness   Genitourinary:   Genitourinary Comments: Suprapubic tenderness  Circumcised penis with blood at the meatus    Musculoskeletal: He exhibits no edema. Lymphadenopathy:     He has no cervical adenopathy. Neurological: He is alert and oriented to person, place, and time. Coordination normal.   Skin: Skin is warm and dry. No rash noted. Psychiatric: He has a normal mood and affect. His behavior is normal.   Nursing note and vitals reviewed.        MDM  Number of Diagnoses or Management Options  Gross hematuria:   Diagnosis management comments: Chronic flank pain now suprapubic pain/tenderness with hematuria gross blood returned with saavedra placement     Ekg; nsr rate 64 first degree block no acute st changes        Amount and/or Complexity of Data Reviewed  Clinical lab tests: ordered and reviewed  Tests in the radiology section of CPT®: ordered and reviewed      ED Course       Procedures      Vitals:  Patient Vitals for the past 12 hrs:   Temp Pulse Resp BP SpO2   10/04/17 1707 - - - - 99 %   10/04/17 1530 - 70 19 148/56 95 %   10/04/17 1515 - 70 20 129/66 94 %   10/04/17 1500 - 69 17 148/67 95 %   10/04/17 1445 - 71 19 136/60 94 %   10/04/17 1430 - 70 21 139/53 94 %   10/04/17 1423 - 75 22 147/68 91 %   10/04/17 1353 - 79 23 - 97 %   10/04/17 1351 - - - (!) 155/127 -   10/04/17 1341 98 °F (36.7 °C) 78 18 (!) 152/98 95 %       Medications ordered:   Medications   0.9% sodium chloride infusion (100 mL/hr IntraVENous Continued On Admission 10/4/17 1600)   sodium chloride 0.9 % bolus infusion 500 mL (0 mL IntraVENous IV Completed 10/4/17 1600)   cefTRIAXone (ROCEPHIN) 1 g in 0.9% sodium chloride (MBP/ADV) 50 mL MBP (0 g IntraVENous IV Completed 10/4/17 1600)   iopamidol (ISOVUE 300) 61 % contrast injection 100 mL (90 mL IntraVENous Given 10/4/17 1609)         Lab findings:  Recent Results (from the past 12 hour(s))   URINALYSIS W/ RFLX MICROSCOPIC    Collection Time: 10/04/17  2:15 PM   Result Value Ref Range    Color RED      Appearance CLEAR      Specific gravity 1.022 1.005 - 1.030      pH (UA) 5.5 5.0 - 8.0      Protein >1000 (A) NEG mg/dL    Glucose NEGATIVE  NEG mg/dL    Ketone NEGATIVE  NEG mg/dL    Bilirubin NEGATIVE  NEG      Blood LARGE (A) NEG      Urobilinogen 1.0 0.2 - 1.0 EU/dL    Nitrites NEGATIVE  NEG      Leukocyte Esterase TRACE (A) NEG     URINE MICROSCOPIC ONLY    Collection Time: 10/04/17  2:15 PM   Result Value Ref Range    WBC 2 to 4 0 - 4 /hpf    RBC TOO NUMEROUS TO COUNT 0 - 5 /hpf    Epithelial cells NEGATIVE  0 - 5 /lpf    Bacteria 2+ (A) NEG /hpf   METABOLIC PANEL, BASIC    Collection Time: 10/04/17  2:25 PM   Result Value Ref Range    Sodium 138 136 - 145 mmol/L    Potassium 3.9 3.5 - 5.5 mmol/L    Chloride 108 100 - 108 mmol/L    CO2 23 21 - 32 mmol/L    Anion gap 7 3.0 - 18 mmol/L    Glucose 82 74 - 99 mg/dL    BUN 25 (H) 7.0 - 18 MG/DL    Creatinine 1.21 0.6 - 1.3 MG/DL    BUN/Creatinine ratio 21 (H) 12 - 20      GFR est AA >60 >60 ml/min/1.73m2    GFR est non-AA 55 (L) >60 ml/min/1.73m2    Calcium 8.8 8.5 - 10.1 MG/DL   POC CHEM8    Collection Time: 10/04/17  2:29 PM   Result Value Ref Range    CO2, POC 22 19 - 24 MMOL/L    Glucose, POC 83 74 - 106 MG/DL    BUN, POC 27 (H) 7 - 18 MG/DL    Creatinine, POC 1.4 (H) 0.6 - 1.3 MG/DL    GFRAA, POC 57 (L) >60 ml/min/1.73m2    GFRNA, POC 47 (L) >60 ml/min/1.73m2    Sodium,  136 - 145 MMOL/L    Potassium, POC 4.8 3.5 - 5.5 MMOL/L    Calcium, ionized (POC) 1.12 1.12 - 1.32 MMOL/L    Chloride,  100 - 108 MMOL/L    Anion gap, POC 13 10 - 20      Hematocrit, POC 39 36 - 49 %    Hemoglobin, POC 13.3 12 - 16 G/DL   TYPE & SCREEN    Collection Time: 10/04/17  3:00 PM   Result Value Ref Range    Crossmatch Expiration 10/07/2017     ABO/Rh(D) O POSITIVE     Antibody screen NEG    EKG, 12 LEAD, INITIAL    Collection Time: 10/04/17  3:03 PM   Result Value Ref Range    Ventricular Rate 69 BPM    Atrial Rate 69 BPM    P-R Interval 308 ms    QRS Duration 108 ms    Q-T Interval 404 ms    QTC Calculation (Bezet) 432 ms    Calculated P Axis 54 degrees    Calculated R Axis 4 degrees    Calculated T Axis 16 degrees    Diagnosis       Sinus rhythm with 1st degree AV block with occasional premature ventricular   complexes  Incomplete left bundle branch block  Borderline ECG  No previous ECGs available     PROTHROMBIN TIME + INR    Collection Time: 10/04/17  5:00 PM   Result Value Ref Range    Prothrombin time 15.0 11.5 - 15.2 sec    INR 1.2 0.8 - 1.2     PTT    Collection Time: 10/04/17  5:00 PM   Result Value Ref Range    aPTT 30.3 23.0 - 36.4 SEC   CBC WITH AUTOMATED DIFF    Collection Time: 10/04/17  5:00 PM   Result Value Ref Range    WBC 7.9 4.6 - 13.2 K/uL    RBC 4.20 (L) 4.70 - 5.50 M/uL    HGB 14.2 13.0 - 16.0 g/dL    HCT 41.2 36.0 - 48.0 %    MCV 98.1 (H) 74.0 - 97.0 FL    MCH 33.8 24.0 - 34.0 PG    MCHC 34.5 31.0 - 37.0 g/dL    RDW 13.2 11.6 - 14.5 %    PLATELET 062 247 - 874 K/uL    MPV 12.2 (H) 9.2 - 11.8 FL NEUTROPHILS 74 (H) 40 - 73 %    LYMPHOCYTES 18 (L) 21 - 52 %    MONOCYTES 7 3 - 10 %    EOSINOPHILS 1 0 - 5 %    BASOPHILS 0 0 - 2 %    ABS. NEUTROPHILS 5.8 1.8 - 8.0 K/UL    ABS. LYMPHOCYTES 1.4 0.9 - 3.6 K/UL    ABS. MONOCYTES 0.6 0.05 - 1.2 K/UL    ABS. EOSINOPHILS 0.1 0.0 - 0.4 K/UL    ABS. BASOPHILS 0.0 0.0 - 0.06 K/UL    DF AUTOMATED           X-Ray, CT or other radiology findings or impressions:  CT ABD PELV W CONT   Final Result   As read by RAD:  IMPRESSION:     1. The bladder is decompressed, with Riley catheter in situ. Small amount of  intravesicular gas likely secondary to catheter placement. Apparent bladder wall  thickening and perivesicular fat stranding are nonspecific, correlation for  symptoms of urinary infection may be helpful. No evidence of hydronephrosis  involving either kidney. 2. Retroperitoneal and pelvic adenopathy, indeterminate in nature. Correlation  for history of malignancy is recommended. 3. Small hiatal hernia. Diverticulosis without evidence of diverticulitis. 4. 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 5. Calcified pleural plaques likely in keeping with prior asbestos exposure. Progress notes, Consult notes or additional Procedure notes:     Reevaluation of patient:   Pt stable in ED ready for discharge     Disposition:  Diagnosis:   1. Gross hematuria        Disposition: home     Follow-up Information     Follow up With Details Comments Contact [facility] EMERGENCY DEPT  As needed, If symptoms worsen [address] [place] [address]    Melissa Jo MD Schedule an appointment as soon as possible for a visit for ED follow up appointment [address] [place] [address] [address] [address] [phone]             Patient's Medications   Start Taking    CEPHALEXIN (KEFLEX) 500 MG CAPSULE    Take 1 Cap by mouth four (4) times daily for 7 days.    Continue Taking AMLODIPINE (NORVASC) 2.5 MG TABLET    Take  by mouth daily. BIMATOPROST (LUMIGAN) 0.03 % OPHTHALMIC DROPS    Administer 1 Drop to both eyes every evening. LISINOPRIL-HYDROCHLOROTHIAZIDE (PRINZIDE) 20-12.5 MG PER TABLET    Take  by mouth daily. These Medications have changed    No medications on file   Stop Taking    No medications on file         Scribe Attestation      Annabel Boyle acting as a scribe for and in the presence of Marty Salmeron MD      October 04, 2017 at 5:34 PM       Provider Attestation:      I personally performed the services described in the documentation, reviewed the documentation, as recorded by the scribe in my presence, and it accurately and completely records my words and actions.  October 04, 2017 at 5:34 PM - Marty Salmeron MD

## 2017-10-04 NOTE — ED TRIAGE NOTES
Patient here with daughter. Patient states right flank pain for \"a few months\". Per daughter patients son noted blood on bathroom floor that he assumed was coming from patient's penis. Patient unsure about blood, has vision loss from macular degeneration.

## 2017-10-04 NOTE — ED NOTES
Patient 89yo delay discharging patient teaching daughter about leg bag for saavedra cath and larger bag for night time when sleep. Patient lives alone with son that works. Discharged patient via wheelchair accompanied by his daughter with follow up Urology.

## 2017-10-04 NOTE — ED NOTES
[facility] EMERGENCY DEPT      80 y.o. male with noted past medical history who presents to the emergency department with right flank pain for a few months and then noted blood in bathroom today by his son, unsure if rectal or  source. I performed a brief evaluation, including history and physical, of the patient here in triage and I have determined that pt will need further treatment and evaluation from the main side ER physician. I have placed initial orders to help in expediting patients care. Dav Moreno M.D.

## 2017-10-05 NOTE — ED NOTES
Catheter irrigated with 200 mL of NS. Only about 30 mL returned. No blockages to irrigation. Riley flushes well.

## 2017-10-05 NOTE — DISCHARGE INSTRUCTIONS

## 2017-10-05 NOTE — ED TRIAGE NOTES
Seen yesterday and had saavedra placed. Drains bloody urine. Had it kincked this morning and now not flowing.

## 2017-10-05 NOTE — ED PROVIDER NOTES
Patient is a 80 y.o. male presenting with urinary catheter problem. The history is provided by the patient. Urinary Catheter Problem    His past medical history is significant for urinary catheter problem. Buck Caal is a 80 y.o. male with history of Bladder retention, CA, HTN presents with c/o bladder pain, urinary retention, not feeling well. Was seen yesterday for the same complaint. States feeling worse. Denies fever but admits to feeling warm. Past Medical History:   Diagnosis Date    Bladder retention     Cancer ([facility] [address]     Elevated PSA     Glaucoma     Hypertension     Macular degeneration     Mumps        Past Surgical History:   Procedure Laterality Date    HX APPENDECTOMY  1940s    HX CATARACT REMOVAL  1990s         Family History:   Problem Relation Age of Onset    Cancer Mother     Hypertension Daughter     Migraines Son        Social History     Social History    Marital status:      Spouse name: N/A    Number of children: N/A    Years of education: N/A     Occupational History    Not on file. Social History Main Topics    Smoking status: Former Smoker     Packs/day: 3.00     Years: 30.00     Types: Cigarettes     Quit date: 1/1/1969    Smokeless tobacco: Never Used    Alcohol use Yes    Drug use: No    Sexual activity: Not on file     Other Topics Concern    Not on file     Social History Narrative         ALLERGIES: Aspirin    Review of Systems  Constitutional:  Denies malaise, fever, chills. Head:  Denies injury. Face:  Denies injury or pain. ENMT:  Denies sore throat. Neck:  Denies injury or pain. Chest:  Denies injury. Cardiac:  Denies chest pain or palpitations. Respiratory:  Denies cough, wheezing, difficulty breathing, shortness of breath. GI/ABD:  Denies injury, pain, distention, nausea, vomiting, diarrhea. :  Bladder pain, urine retention, clogged catheter Denies injury, pain, dysuria or urgency.    Back:  Denies injury or pain.   Pelvis:  Denies injury or pain. Extremity/MS:  Denies injury or pain. Neuro:  Denies headache, LOC, dizziness, neurologic symptoms/deficits/paresthesias. Skin: Denies injury, rash, itching or skin changes. Vitals:    10/05/17 1439   BP: 148/74   Pulse: 61   Resp: 16   Temp: 98 °F (36.7 °C)   SpO2: 100%   Weight: 75.8 kg (167 lb)            Physical Exam   Nursing note and vitals reviewed. CONSTITUTIONAL: Alert, ill appearing  well-developed; well-nourished. HEAD:  Normocephalic, atraumatic. EYES: PERRL; EOM's intact. ENTM: Nose: No rhinorrhea; Throat: mucous membranes moist. Posterior pharynx-normal.  Neck:  No JVD, supple without lymphadenopathy. RESP: Chest clear, equal breath sounds. CV: S1 and S2 WNL; No murmurs, gallops or rubs. GI: Abdomen soft and generalized suprapubic tenderness +BS, NG, NR  No masses or organomegaly. : catheter in place, clots in catheter  UPPER EXT:  Normal inspection. LOWER EXT: Normal inspection. NEURO: strength 5/5 and sym, sensation intact. SKIN: No rashes; Normal for age and stage. PSYCH:  Alert and oriented, normal affect. MDM  Number of Diagnoses or Management Options  Diagnosis management comments: DDx: gastroenteritis, GERD, hernia, hepatitis, pancreatitis, gallbladder etiology, constipation, adhesions, UTI, pyelo, kidney stones, STD, appendicitis, diverticulitis, SBO, GI bleed, mesenteric ischemia, AAA, cardiac etiology, musculoskeletal pain/spasm, malignancy  IMPRESSION AND MEDICAL DECISION MAKING:  Based upon the patient's presentation with noted HPI and PE, along with the work up done in the emergency department, I believe that the patient had a clogged urinary catheter as well as hematuria. Will have him follow up with Urology this week. The patient will be discharged home. Warning signs of worsening condition were discussed and understood by the patient.  Based on patient's age, coexisting illness, exam, and the results of this ED evaluation, the decision to treat as an outpatient was made. Based on the information available at time of discharge, acute pathology requiring immediate intervention was deemed relative unlikely. While it is impossible to completely exclude the possibility of underlying serious disease or worsening of condition, I feel the relative likelihood is extremely low. I discussed this uncertainty with the patient, who understood ED evaluation and treatment and felt comfortable with the outpatient treatment plan. All questions regarding care, test results, and follow up were answered. The patient is stable and appropriate to discharge. They understand that they should return to the emergency department for any new or worsening symptoms. I stressed the importance of follow up for repeat assessment and possibly further evaluation/treatment. Amount and/or Complexity of Data Reviewed  Clinical lab tests: ordered and reviewed  Tests in the medicine section of CPT®: ordered and reviewed  Review and summarize past medical records: yes    Patient Progress  Patient progress: improved (Pt states that he feels much better after changing catheter. Feels like he is able to go home at this time.)    ED Course   6:00 PM  Spoke with Urology and advised to place 20 guage catheter and irrigate. Will call back later this evening and see how Pt is doing at that time. If Pt is feeling better may discharge home.       Procedures      Vitals:  Patient Vitals for the past 12 hrs:   Temp Pulse Resp BP SpO2   10/05/17 1439 98 °F (36.7 °C) 61 16 148/74 100 %         Medications ordered:   Medications   lidocaine (URO-JET) 2 % jelly ( Urethral Given 10/5/17 1920)         Lab findings:  Recent Results (from the past 12 hour(s))   POC LACTIC ACID    Collection Time: 10/05/17  4:26 PM   Result Value Ref Range    Lactic Acid (POC) 1.4 0.4 - 2.0 mmol/L   CBC WITH AUTOMATED DIFF    Collection Time: 10/05/17  4:42 PM   Result Value Ref Range WBC 11.7 4.6 - 13.2 K/uL    RBC 4.02 (L) 4.70 - 5.50 M/uL    HGB 13.7 13.0 - 16.0 g/dL    HCT 39.2 36.0 - 48.0 %    MCV 97.5 (H) 74.0 - 97.0 FL    MCH 34.1 (H) 24.0 - 34.0 PG    MCHC 34.9 31.0 - 37.0 g/dL    RDW 13.3 11.6 - 14.5 %    PLATELET 567 130 - 344 K/uL    MPV 12.6 (H) 9.2 - 11.8 FL    NEUTROPHILS 77 (H) 40 - 73 %    LYMPHOCYTES 11 (L) 21 - 52 %    MONOCYTES 11 (H) 3 - 10 %    EOSINOPHILS 1 0 - 5 %    BASOPHILS 0 0 - 2 %    ABS. NEUTROPHILS 9.1 (H) 1.8 - 8.0 K/UL    ABS. LYMPHOCYTES 1.2 0.9 - 3.6 K/UL    ABS. MONOCYTES 1.3 (H) 0.05 - 1.2 K/UL    ABS. EOSINOPHILS 0.1 0.0 - 0.4 K/UL    ABS. BASOPHILS 0.0 0.0 - 0.06 K/UL    DF AUTOMATED     METABOLIC PANEL, COMPREHENSIVE    Collection Time: 10/05/17  4:42 PM   Result Value Ref Range    Sodium 137 136 - 145 mmol/L    Potassium 4.2 3.5 - 5.5 mmol/L    Chloride 105 100 - 108 mmol/L    CO2 21 21 - 32 mmol/L    Anion gap 11 3.0 - 18 mmol/L    Glucose 90 74 - 99 mg/dL    BUN 31 (H) 7.0 - 18 MG/DL    Creatinine 1.35 (H) 0.6 - 1.3 MG/DL    BUN/Creatinine ratio 23 (H) 12 - 20      GFR est AA 59 (L) >60 ml/min/1.73m2    GFR est non-AA 49 (L) >60 ml/min/1.73m2    Calcium 8.9 8.5 - 10.1 MG/DL    Bilirubin, total 1.4 (H) 0.2 - 1.0 MG/DL    ALT (SGPT) 14 (L) 16 - 61 U/L    AST (SGOT) 20 15 - 37 U/L    Alk. phosphatase 73 45 - 117 U/L    Protein, total 6.9 6.4 - 8.2 g/dL    Albumin 3.5 3.4 - 5.0 g/dL    Globulin 3.4 2.0 - 4.0 g/dL    A-G Ratio 1.0 0.8 - 1.7         EKG interpretation by ED Physician:      X-Ray, CT or other radiology findings or impressions:  No orders to display       Progress notes, Consult notes or additional Procedure notes:       Disposition:  Diagnosis:   1. Obstruction of urinary catheter, initial encounter ([facility] [address]    2. Hematuria, unspecified type        Disposition:   7:46 PM  Pt reevaluated at this time and is resting comfortably in NAD. Discussed results and findings, as well as, diagnosis and plan for discharge.  Pt verbalizes understanding and agreement with plan. All questions addressed at this time. Follow-up Information     Follow up With Details Comments Contact Info    MIYA Jimenez MD Schedule an appointment as soon as possible for a visit in 2 days  [address] [place] [address]      Parag Samuel MD Schedule an appointment as soon as possible for a visit in 2 days  [address] [facility] EMERGENCY DEPT  If symptoms worsen [address]  [phone]           Patient's Medications   Start Taking    No medications on file   Continue Taking    AMLODIPINE (NORVASC) 2.5 MG TABLET    Take  by mouth daily. BIMATOPROST (LUMIGAN) 0.03 % OPHTHALMIC DROPS    Administer 1 Drop to both eyes every evening. CEPHALEXIN (KEFLEX) 500 MG CAPSULE    Take 1 Cap by mouth four (4) times daily for 7 days. LISINOPRIL-HYDROCHLOROTHIAZIDE (PRINZIDE) 20-12.5 MG PER TABLET    Take  by mouth daily.      These Medications have changed    No medications on file   Stop Taking    No medications on file

## 2017-10-06 PROBLEM — R31.9 HEMATURIA: Status: ACTIVE | Noted: 2017-01-01

## 2017-10-06 NOTE — H&P
History and Physical    Patient: Lloyd Riggins               Sex: male          DOA: 10/6/2017       YOB: 1919      Age:  80 y.o.        LOS:  LOS: 0 days        HPI:     Lloyd Riggins is a 80 y.o. male who was admitted to the hospital at [facility] because of hematuria he has a h/o  Macular degeraion and has very poor vision he has a h/o htn  and has a 90 pack year h/o cigarette smoking in the past as per the old records . he has a h/o elevated psa and has been followed by urology . onthis occasion he  came to the er because of hematuria about 2 days ago and was given an antibiotic he had a saavedra put in and he began pulling on the saavedra at home . Last night the pt became confused and was agitated . He apparently tried to leave his house at 1.00 am  But was stopped by his family he was again brought to the er and a ct scan was examined . This was done on 10/4 /17 the pt was seen by urology and he will be admitted . The differential is bladder cancer vs prostate cancer with hematuria . His h/h is 14.0/40.5 his urine yesterday showed no growth . The ct scan  Showed the bladder wqas decompressed  With bladder wall thickening  And perivesicular  Fat stranding  .  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       Past Medical History:   Diagnosis Date    Bladder retention     Cancer ([address]     Elevated PSA     Glaucoma     Hypertension     Macular degeneration     Mumps        Social History:   Tobacco use:90 pack year history of cigarette smoking   Alcohol use:none   Occupation:retired     Family History:has 3 children  2 boys and one girl Living arrangements - the patient lives with a son    Review of Systems    Constitutional:  No fever or weight loss  HEENT:  No headache or visual changes  Cardiovascular:  No chest pain or diaphoresis  Respiratory:  No coughing, wheezing, or shortness of breath. GI:  No nausea or vomitting. No diarrhea  : Has hematuria  Skin:  No rashes or moles  Neuro:  No seizures or syncope  Hematological:  No bruising or bleeding  Endocrine:  No diabetes or thyroid disease    Physical Exam:      Visit Vitals    /87    Pulse 60    Temp 97.5 °F (36.4 °C)    Resp 18    Ht 5' 3\" (1.6 m)    Wt 75.8 kg (167 lb 1.7 oz)    SpO2 97%    BMI 29.6 kg/m2       Physical Exam:    Gen:pt is markedly alert .has decreased vision  HEENT:  Normal cephalic atraumatic, extra-occular movements are intact. Neck:  Supple, No JVD  Lungs:  Clear bilaterally, no wheeze, no rales, normal effort  Heart:  Regular Rate and Rhythm, normal S1 and S2, no edema  Abdomen:  Soft, non tender, normal bowel sounds, no guarding. Extremities:  Well perfused,has a saavedra in place . has hematuria  Neurological:  Awake and alert, CN's are intact, normal strength throughout extremities  Skin:  No rashes or moles  Mental Status: has some confusion  Laboratory Studies:    CMP:   Lab Results   Component Value Date/Time     10/06/2017 12:35 PM    K 3.8 10/06/2017 12:35 PM     10/06/2017 12:35 PM    CO2 23 10/06/2017 12:35 PM    AGAP 8 10/06/2017 12:35 PM    GLU 91 10/06/2017 12:35 PM    BUN 34 (H) 10/06/2017 12:35 PM    CREA 1.23 10/06/2017 12:35 PM    GFRAA >60 10/06/2017 12:35 PM    GFRNA 54 (L) 10/06/2017 12:35 PM    CA 8.7 10/06/2017 12:35 PM     CBC:   Lab Results   Component Value Date/Time    WBC 10.9 10/06/2017 09:33 AM    HGB 14.0 10/06/2017 09:33 AM    HCT 40.5 10/06/2017 09:33 AM     10/06/2017 09:33 AM       Assessment/Plan     Active Problems:    Hematuria (10/6/2017)with evidence of retroperitoneal  And pelvic adenopathy r/o bladder cancer vs prostate cancer as the source of bleeding   Macular degeneration with limited vision   htn    PLAN: admit to the hospital . Urology will follow .     \

## 2017-10-06 NOTE — ED PROVIDER NOTES
HPI Comments: 9:22 AM Deni Zamorano is a 80 y.o. Male w/ PMHx of CA, HTN, and macular degenerration who presents to the ED c/o  Urinary catheter problem. Pt was in the ED 2 days ago and was treated for hematuria with abx; per pt's daughter pt has been taking the abx as prescribed. Pt was in the ED yesterday for obstruction of urinary catheter. Per pt's daughter, pt was initially pleased with his ED visit yesterday and was not complaining of pain or complication with the newly placed catheter until they got home. By the time the pt was home he was pulling at the catheter constantly. Pt's daughter states she had to keep fixing it and taping it to his leg. Per pt's daughter pt became much more confused and aggitated last night; pt was \"talking about things that weren't there\" and cursing and yelling at his daughter which is abnormal. Pt also tried leaving his home at about 1:00 am this morning. Pt's daughter noticed he is bleeding from the catheter site in addition to still passing blood in urine. Pt's appetite is normally poor however there has been no recent change to appetite. Pt has an appointment with  in 6 days. Pt has no other sx or complaints. Past Medical History:   Diagnosis Date    Bladder retention     [facility] ([facility] [address]     Elevated PSA     Glaucoma     Hypertension     Macular degeneration     Mumps        Past Surgical History:   Procedure Laterality Date    HX APPENDECTOMY  1940s    HX CATARACT REMOVAL  1990s         Family History:   Problem Relation Age of Onset    Cancer Mother     Hypertension Daughter     Migraines Son        Social History     Social History    Marital status:      Spouse name: N/A    Number of children: N/A    Years of education: N/A     Occupational History    Not on file.      Social History Main Topics    Smoking status: Former Smoker     Packs/day: 3.00     Years: 30.00     Types: Cigarettes     Quit date: 1/1/1969    Smokeless tobacco: Never Used    Alcohol use Yes    Drug use: No    Sexual activity: Not on file     Other Topics Concern    Not on file     Social History Narrative         ALLERGIES: Aspirin    Review of Systems   Genitourinary: Positive for hematuria. Psychiatric/Behavioral: Positive for agitation and confusion. All other systems reviewed and are negative. Vitals:    10/06/17 1130 10/06/17 1145 10/06/17 1200 10/06/17 1215   BP:  138/57 130/44 143/70   Pulse: 73 73 82 73   Resp: 19 19 18    Temp:       SpO2: 96% 95% 95% 95%   Weight:       Height:                Physical Exam   Constitutional: He is oriented to person, place, and time. He appears well-developed and well-nourished. No distress. HENT:   Head: Normocephalic and atraumatic. Mouth/Throat: Oropharynx is clear and moist.   Eyes: Conjunctivae and EOM are normal. Pupils are equal, round, and reactive to light. No scleral icterus. Neck: Normal range of motion. Neck supple. Cardiovascular: Normal rate, regular rhythm and normal heart sounds. No murmur heard. Pulmonary/Chest: Effort normal and breath sounds normal. No respiratory distress. Abdominal: Soft. Bowel sounds are normal. He exhibits no distension. There is no tenderness. Genitourinary:   Genitourinary Comments: Riley catheter in place, draining bloody urine; bleeding around meatus   Musculoskeletal: He exhibits no edema. Lymphadenopathy:     He has no cervical adenopathy. Neurological: He is alert and oriented to person, place, and time. Coordination normal.   Skin: Skin is warm and dry. No rash noted. Psychiatric: He has a normal mood and affect. His behavior is normal.   Nursing note and vitals reviewed.        MDM  Number of Diagnoses or Management Options  Gross hematuria:   Diagnosis management comments: Seen X2 in Ed previous this week for hematuria now continued hematuria with increased agitation continued hematuria per family compliant with meds h/o elevated PSA in past ? If UTI resistant to outpt tx will start CBI in ED     Labs reviewed will admit uti hematuria        Amount and/or Complexity of Data Reviewed  Clinical lab tests: ordered and reviewed  Tests in the radiology section of CPT®: reviewed  Independent visualization of images, tracings, or specimens: yes    Risk of Complications, Morbidity, and/or Mortality  Presenting problems: high  Diagnostic procedures: moderate  Management options: moderate      ED Course       Procedures  Vitals:  Patient Vitals for the past 12 hrs:   Temp Pulse Resp BP SpO2   10/06/17 1215 - 73 - 143/70 95 %   10/06/17 1200 - 82 18 130/44 95 %   10/06/17 1145 - 73 19 138/57 95 %   10/06/17 1130 - 73 19 - 96 %   10/06/17 1115 - 71 17 138/64 96 %   10/06/17 1100 - 64 17 137/62 95 %   10/06/17 1045 - 65 18 148/66 96 %   10/06/17 1030 - 62 15 133/62 95 %   10/06/17 1015 - 61 16 137/58 96 %   10/06/17 1000 - 62 15 134/70 96 %   10/06/17 0945 - 63 22 150/53 97 %   10/06/17 0930 - 63 17 133/56 96 %   10/06/17 0915 - 64 16 118/56 96 %   10/06/17 0838 97.5 °F (36.4 °C) 65 16 133/65 97 %       Medications Ordered:  Medications   0.9% sodium chloride infusion (100 mL/hr IntraVENous New Bag 10/6/17 1004)   levoFLOXacin (LEVAQUIN) 500 mg in D5W IVPB (500 mg IntraVENous New Bag 10/6/17 1108)       Lab Findings:  Recent Results (from the past 12 hour(s))   CBC WITH AUTOMATED DIFF    Collection Time: 10/06/17  9:33 AM   Result Value Ref Range    WBC 10.9 4.6 - 13.2 K/uL    RBC 4.11 (L) 4.70 - 5.50 M/uL    HGB 14.0 13.0 - 16.0 g/dL    HCT 40.5 36.0 - 48.0 %    MCV 98.5 (H) 74.0 - 97.0 FL    MCH 34.1 (H) 24.0 - 34.0 PG    MCHC 34.6 31.0 - 37.0 g/dL    RDW 13.5 11.6 - 14.5 %    PLATELET 262 557 - 533 K/uL    MPV 12.0 (H) 9.2 - 11.8 FL    NEUTROPHILS 80 (H) 40 - 73 %    LYMPHOCYTES 9 (L) 21 - 52 %    MONOCYTES 10 3 - 10 %    EOSINOPHILS 1 0 - 5 %    BASOPHILS 0 0 - 2 %    ABS. NEUTROPHILS 8.7 (H) 1.8 - 8.0 K/UL    ABS. LYMPHOCYTES 1.0 0.9 - 3.6 K/UL    ABS.  MONOCYTES 1.1 0.05 - 1.2 K/UL    ABS. EOSINOPHILS 0.1 0.0 - 0.4 K/UL    ABS. BASOPHILS 0.0 0.0 - 0.06 K/UL    DF AUTOMATED     PROTHROMBIN TIME + INR    Collection Time: 10/06/17  9:33 AM   Result Value Ref Range    Prothrombin time 14.4 11.5 - 15.2 sec    INR 1.2 0.8 - 1.2     PTT    Collection Time: 10/06/17  9:33 AM   Result Value Ref Range    aPTT 29.8 23.0 - 36.4 SEC   URINALYSIS W/ RFLX MICROSCOPIC    Collection Time: 10/06/17  9:49 AM   Result Value Ref Range    Color YELLOW      Appearance BLOODY      Specific gravity 1.028 1.005 - 1.030      pH (UA) 5.5 5.0 - 8.0      Protein >1000 (A) NEG mg/dL    Glucose NEGATIVE  NEG mg/dL    Ketone 15 (A) NEG mg/dL    Bilirubin NEGATIVE  NEG      Blood LARGE (A) NEG      Urobilinogen 1.0 0.2 - 1.0 EU/dL    Nitrites NEGATIVE  NEG      Leukocyte Esterase MODERATE (A) NEG     URINE MICROSCOPIC ONLY    Collection Time: 10/06/17  9:49 AM   Result Value Ref Range    WBC 4 to 10 0 - 4 /hpf    RBC TOO NUMEROUS TO COUNT 0 - 5 /hpf    Epithelial cells FEW 0 - 5 /lpf    Bacteria 4+ (A) NEG /hpf       EKG Interpretation by ED physician:      X-ray, CT or radiology findings or impressions:  No orders to display       Progress notes, consult notes, or additional procedure notes:  11:00 AM, 10/6/2017   Consult:  Discussed care with Dr. Dorene Bee, urology. Standard discussion; including history of patients chief complaint, available diagnostic results, and treatment course. Agrees with consultations. Requesting medical admission. 11:08 AM, 10/6/2017   Consult:  Discussed care with Dr. Georgean Gottron, hospitalist. Standard discussion; including history of patients chief complaint, available diagnostic results, and treatment course. Agrees to admit. 12:28 PM: Right EJ placed good blood flow flushes easily no complications           Reevaluation of the patient:       Diagnosis:   1.  Gross hematuria        Disposition: home     Follow-up Information     None           Patient's Medications   Start Taking No medications on file   Continue Taking    AMLODIPINE (NORVASC) 2.5 MG TABLET    Take  by mouth daily. BIMATOPROST (LUMIGAN) 0.03 % OPHTHALMIC DROPS    Administer 1 Drop to both eyes every evening. CEPHALEXIN (KEFLEX) 500 MG CAPSULE    Take 1 Cap by mouth four (4) times daily for 7 days. LISINOPRIL-HYDROCHLOROTHIAZIDE (PRINZIDE) 20-12.5 MG PER TABLET    Take  by mouth daily. These Medications have changed    No medications on file   Stop Taking    No medications on file       [address] [facility] acting as a scribe for and in the presence of Carolina Herman MD      October 06, 2017 at 9:21 AM       Provider Attestation:      I personally performed the services described in the documentation, reviewed the documentation, as recorded by the scribe in my presence, and it accurately and completely records my words and actions.  October 06, 2017 at 9:21 AM - Carolina Herman MD

## 2017-10-06 NOTE — IP AVS SNAPSHOT
[address] Patient: Deni Zamorano MRN: [id number] OUH:[id number] You are allergic to the following Allergen Reactions Aspirin Hives Recent Documentation Height Weight BMI Smoking Status 1.575 m 65.6 kg 26.45 kg/m2 Former Smoker Emergency Contacts Name Discharge Info Relation Home Work Mobile Megha Kemp N/A  AT THIS TIME [6] Daughter [21] [phone] About your hospitalization You were admitted on:  October 6, 2017 You last received care in the:  [facility] 2E GEN SURG You were discharged on:  October 12, 2017 Unit phone number:  [phone] Why you were hospitalized Your primary diagnosis was:  Hematuria Your diagnoses also included: Altered Mental Status, Prostate Cancer (Hcc), Debility Providers Seen During Your Hospitalizations Provider Role Specialty Primary office phone Kayli Mobley MD Attending Provider Emergency Medicine [phone] Ashely Núñez MD Attending Provider Internal Medicine [phone] Anjel Posada DO Attending Provider Internal Medicine [phone] Wyn Mortimer, MD Attending Provider [facility] [phone] Your Primary Care Physician (PCP) Primary Care Physician Office Phone Office Fax Shannen Ivan [phone] Follow-up Information None Current Discharge Medication List  
  
ASK your doctor about these medications Dose & Instructions Dispensing Information Comments Morning Noon Evening Bedtime  
 cephALEXin 500 mg capsule Commonly known as:  John Polanco Ask about: Should I take this medication? Your last dose was: Your next dose is:    
   
   
 Dose:  500 mg Take 1 Cap by mouth four (4) times daily for 7 days. Quantity:  28 Cap Refills:  0 * bimatoprost 0.01 % ophthalmic drops Commonly known as:  LUMIGAN Your last dose was: Your next dose is:    
   
   
 Dose:  1 Drop Administer 1 Drop to left eye every evening. Refills:  0  
     
   
   
   
  
 * LUMIGAN 0.03 % ophthalmic drops Generic drug:  bimatoprost  
   
Your last dose was: Your next dose is:    
   
   
 Dose:  1 Drop Administer 1 Drop to left eye every evening. Refills:  0 NORVASC 2.5 mg tablet Generic drug:  amLODIPine Your last dose was: Your next dose is: Take  by mouth daily. Refills:  0 PRINZIDE 20-12.5 mg per tablet Generic drug:  lisinopril-hydroCHLOROthiazide Your last dose was: Your next dose is: Take  by mouth daily. Refills:  0  
     
   
   
   
  
 timolol maleate 0.5 % Drpd ophthalmic solution Your last dose was: Your next dose is:    
   
   
 Dose:  1 Drop Administer 1 Drop to left eye two (2) times a day. Refills:  0  
     
   
   
   
  
 * Notice: This list has 2 medication(s) that are the same as other medications prescribed for you. Read the directions carefully, and ask your doctor or other care provider to review them with you. Discharge Instructions None Discharge Instructions Attachments/References SELF-CATHETERIZATION: MALE (ENGLISH) ALTERED MENTAL STATUS (ENGLISH) HOSPICE (ENGLISH) Discharge Orders None Introducing \[facility] [place] [facility]\"" & HEALTH SERVICES! Filomena Moran introduces [employer] patient portal. Now you can access parts of your medical record, email your doctor's office, and request medication refills online. 1. In your internet browser, go to [email or web address] 2. Click on the First Time User? Click Here link in the Sign In box. You will see the New Member Sign Up page. 3. Enter your [employer] Access Code exactly as it appears below.  You will not need to use this code after youve completed the sign-up process. If you do not sign up before the expiration date, you must request a new code. · [employer] Access Code: [email or web address] Expires: 1/2/2018  5:33 PM 
 
4. Enter the last four digits of your Social Security Number (xxxx) and Date of Birth (mm/dd/yyyy) as indicated and click Submit. You will be taken to the next sign-up page. 5. Create a [employer] ID. This will be your [employer] login ID and cannot be changed, so think of one that is secure and easy to remember. 6. Create a [employer] password. You can change your password at any time. 7. Enter your Password Reset Question and Answer. This can be used at a later time if you forget your password. 8. Enter your e-mail address. You will receive e-mail notification when new information is available in [address] 9. Click Sign Up. You can now view and download portions of your medical record. 10. Click the Download Summary menu link to download a portable copy of your medical information. If you have questions, please visit the Frequently Asked Questions section of the [employer] website. Remember, [employer] is NOT to be used for urgent needs. For medical emergencies, dial 911. Now available from your iPhone and Android! General Information Please provide this summary of care documentation to your next provider. Patient Signature:  ____________________________________________________________ Date:  ____________________________________________________________  
  
[employer] Provider Signature:  ____________________________________________________________ Date:  ____________________________________________________________ More Information Intermittent Self-Catheterization (Male): Care Instructions Your Care Instructions Intermittent self-catheterization is a way to completely empty your bladder when you need to. You put a very thin tube (catheter) into your bladder to allow the urine to flow out. You may use a catheter if you have nerve damage, a problem with your urinary tract, an infection, or diseases that weaken your muscles. Emptying your bladder regularly can prevent urine leaks during the day and kidney damage from blocked urine and infections. You can empty your bladder every 4 to 6 hours, or as your doctor recommends. It takes practice to learn how to place the catheter. It may be uncomfortable at first, but it should not cause pain. Follow-up care is a key part of your treatment and safety. Be sure to make and go to all appointments, and call your doctor if you are having problems. Its also a good idea to know your test results and keep a list of the medicines you take. How can you care for yourself at home? · Note signs that you may need to empty your bladder. These include swelling in your belly, a feeling of fullness, sweating, chills, or a headache. · Gather the supplies you need to insert the catheter. These include the catheter, a lubricating jelly such as K-Y Jelly that dissolves in water (not petroleum jelly like Vaseline), and a container to hold the urine. ¨ Wash and dry your hands. ¨ Place the urine container between your legs. ¨ Clean the end of your penis well with soap and water. ¨ Spread the lubricating jelly on the tip of the catheter and put the other end of the catheter in the container. ¨ Insert the catheter into the opening to the penis. Move the catheter until it is in the bladder and urine begins to flow out. Then insert it about 1 inch more. ¨ Let the urine drain into the container. ¨ Remove the catheter slowly. Wash it with warm, soapy water; dry it; and put it into a clean container. ¨ Wash and dry your hands. When should you call for help? Call your doctor now or seek immediate medical care if: · You have a fever not caused by the flu or other illness. · You have severe pain in your lower back. · You have blood or pus in your urine. · Your urine is cloudy or smells bad. · You have pain or bleeding when you insert the catheter. · You have swelling in your belly. · You cannot empty your bladder with the catheter. · Your symptoms do not get better, or they get worse. Watch closely for changes in your health, and be sure to contact your doctor if you have any problems. Where can you learn more? Go to http://bhavin-merari.info/. Enter S231 in the search box to learn more about \"Intermittent Self-Catheterization (Male): Care Instructions. \" Current as of: August 12, 2016 Content Version: 11.3 © 0884-2512 [employer]. Care instructions adapted under license by [employer] (which disclaims liability or warranty for this information). If you have questions about a medical condition or this instruction, always ask your healthcare professional. [place] [address] any warranty or liability for your use of this information. Altered Mental Status: Care Instructions Your Care Instructions Altered mental status is a change in how well your brain is working. As a result, you may be confused, be less alert than usual, or act in odd ways. This may include seeing or hearing things that aren't really there (hallucinations). A mental status change has many possible causes. For example, it may be the result of an infection, an imbalance of chemicals in the body, or a chronic disease such as diabetes or COPD. It can also be caused by things such as a head injury, taking certain medicines, or using alcohol or drugs. The doctor may do tests to look for the cause. These tests may include urine tests, blood tests, and imaging tests such as a CT scan. Sometimes a clear cause isn't found.  But tests can help the doctor rule out a serious cause of your symptoms. A change in mental status can be scary. But mental status will often return to normal when the cause is treated. So it is important to get any follow-up testing or treatment the doctor has suggested. The doctor has checked you carefully, but problems can develop later. If you notice any problems or new symptoms, get medical treatment right away. Follow-up care is a key part of your treatment and safety. Be sure to make and go to all appointments, and call your doctor if you are having problems. It's also a good idea to know your test results and keep a list of the medicines you take. How can you care for yourself at home? · Be safe with medicines. Take your medicines exactly as prescribed. Call your doctor if you think you are having a problem with your medicine. · Have another adult stay with you until you are better. This can help keep you safe. Ask that person to watch for signs that your mental status is getting worse. When should you call for help? Call 911 anytime you think you may need emergency care. For example, call if: 
· You passed out (lost consciousness). Call your doctor now or seek immediate medical care if: 
· Your mental status is getting worse. · You have new symptoms, such as a fever, chills, or shortness of breath. · You do not feel safe. Watch closely for changes in your health, and be sure to contact your doctor if: 
· You do not get better as expected. Where can you learn more? Go to http://bhavin-merari.info/. Enter P924 in the search box to learn more about \"Altered Mental Status: Care Instructions. \" Current as of: October 14, 2016 Content Version: 11.3 © 7908-6232 [employer]. Care instructions adapted under license by [employer] (which disclaims liability or warranty for this information).  If you have questions about a medical condition or this instruction, always ask your healthcare professional. [place] [address] any warranty or liability for your use of this information. Hospice: Care Instructions Your Care Instructions Hospice care provides medical treatment to relieve symptoms at the end of life. The goal is to keep you comfortable, not to try to cure you. Hospice care does not speed up or lengthen dying. It focuses on easing pain and other symptoms. Hospice caregivers want to enhance your quality of life. Hospice care also offers emotional help and spiritual support when you are dying. It also helps family members care for a loved one who is dying. Hospice care can help you review your life, say important things to family and friends, and explore spiritual issues. Hospice also helps your family and friends deal with their grief after you die. You can use hospice care if your illness cannot be cured and doctors believe you have no more than 6 months to live. You do not need to be confined to a bed or in a hospital to benefit from this type of care. The hospice team includes nurses, counselors, therapists, social workers, pastors, home health aides, and trained volunteers. You can get care in your own home or in a hospice center. Some hospice workers also go to nursing homes or hospitals. How can you care for yourself at home? · Prepare a list of advance directives. These are instructions to your doctor and family members about what kind of care you want if you become unable to speak or express yourself. · Find out if your health insurance covers hospice care. · Find hospice programs in your area. People who can help include your doctor, your state health department, and your insurance company. · Decide what kinds of hospice services you want. It helps to know what you want before you enter a hospice program. 
· Think about some questions when preparing for hospice care. ¨ Who do you want to make decisions about your medical care if you are not able to? Many people choose their spouse, child, or doctor. ¨ What are you most afraid of that might happen? You might be afraid of having pain or losing your independence. Let your hospice team know your fears. The team can help you deal with them. ¨ Where would you prefer to die? Choices include your home, a hospital, or a nursing home. ¨ Do you want to donate organs when you die? Make sure that your family clearly understands this. ¨ Do you want any Confucianism rites or practices to be done before you die? Let your hospice team know what you want. Where can you learn more? Go to http://bhavin-merari.info/. Enter D523 in the search box to learn more about \"Hospice: Care Instructions. \" Current as of: April 3, 2017 Content Version: 11.3 © 0646-5528 [employer]T.S[employer]. Care instructions adapted under license by [employer] (which disclaims liability or warranty for this information). If you have questions about a medical condition or this instruction, always ask your healthcare professional. [address] any warranty or liability for your use of this information.

## 2017-10-06 NOTE — PROGRESS NOTES
completed the initial Spiritual Assessment of the patient in bed 7 of the emergency room , and offered Pastoral Care support. Patient does not have any Buddhist/cultural needs that will affect patients preferences in health care.  Chaplains will continue to follow and will provide pastoral care on an as needed/requested basis     Chaplain Gallito Michelle   Board Certified [address] [place] [address]   [phone]

## 2017-10-06 NOTE — ED TRIAGE NOTES
Per family, \"his catheter is leaking. He had it put in last night. He has been pulling at it and I've had to tape it up. I just want it looked at. \"

## 2017-10-06 NOTE — PROGRESS NOTES
Problem: Falls - Risk of  Goal: *Absence of Falls  Document Harjinder Fall Risk and appropriate interventions in the flowsheet.    Outcome: Progressing Towards Goal  Fall Risk Interventions:  Mobility Interventions: Utilize walker, cane, or other assitive device     Mentation Interventions: Family/sitter at bedside, Toileting rounds, More frequent rounding

## 2017-10-06 NOTE — CONSULTS
Urology Consult    Subjective:     Date of Consultation:  October 6, 2017    Referring Physician: Dr. Cannon [facility]    Reason for Consultation:  Gross hematuria, bladder vs prostate mass    Patient Name: Lilian Frances  MRN: [id number]    History of Present Illness:   Patient is a 80 y.o.  male who is being seen for gross hematuria and clots. He was admitted to the hospital for Hematuria. This is his third ER visit this week for hematuria. He had a saavedra in place and apparently pulled on it due to confusion which caused some more bleeding. A 26 Fr [address] way saavedra was placed in the ER and CBI was started. His urine is now clear on brisk CBI. He has a h/o of bladder tumor that was resected by Dr. Simona Palomo several years (2010) but pathology was benign, showed chronic inflammation. His daughter is with him and provides most of the history today. She reports he has had a poor appetite for months, comments that he has lost weight over the last 6 months but unsure how much. Prior to this week, he had not been having any issues with hematuria. CT scan was done yesterday which shows a likely bladder mass on right lateral wall and he has extensive pelvic and RP LAD measuring up to 5 cm. Could also represent PCa.       Past Medical History:   Diagnosis Date    Bladder retention     Cancer ([facility] [address]     Elevated PSA     Glaucoma     Hypertension     Macular degeneration     Mumps       Past Surgical History:   Procedure Laterality Date    HX APPENDECTOMY  1940s    HX CATARACT REMOVAL  1990s      Family History   Problem Relation Age of Onset    Cancer Mother     Hypertension Daughter     Migraines Son       Social History   Substance Use Topics    Smoking status: Former Smoker     Packs/day: 3.00     Years: 30.00     Types: Cigarettes     Quit date: 1/1/1969    Smokeless tobacco: Never Used    Alcohol use Yes     Allergies   Allergen Reactions    Aspirin Hives      Prior to Admission medications Medication Sig Start Date End Date Taking? Authorizing Provider   cephALEXin (KEFLEX) 500 mg capsule Take 1 Cap by mouth four (4) times daily for 7 days. 10/4/17 10/11/17  Peewee Delatorre MD   bimatoprost (LUMIGAN) 0.03 % ophthalmic drops Administer 1 Drop to both eyes every evening. Historical Provider   amLODIPine (NORVASC) 2.5 mg tablet Take  by mouth daily. Historical Provider   lisinopril-hydrochlorothiazide (PRINZIDE) 20-12.5 mg per tablet Take  by mouth daily. Historical Provider       Review of Systems  Constitutional: Fever:  no  Skin: Rash:  no  HEENT: Hearing difficulty:  yes  Eyes: Blurred vision:  yes  Cardiovascular: Chest pain:  no  Respiratory: Shortness of breath:  no  Gastrointestinal: Nausea/vomiting:  no  Musculoskeletal: Back pain:  no  Neurological: Weakness:  yes  Psychological: Memory loss:  yes  Comments/additional findings:       Objective:     Data Review (Labs):    Recent Labs      10/06/17   1235  10/06/17   0933  10/05/17   1642  10/04/17   1700  10/04/17   1425   WBC   --   10.9  11.7  7.9   --    HGB   --   14.0  13.7  14.2   --    MCV   --   98.5*  97.5*  98.1*   --    PLT   --   158  198  151   --    NA  137   --   137   --   138   K  3.8   --   4.2   --   3.9   CREA  1.23   --   1.35*   --   1.21   BUN  34*   --   31*   --   25*   ALB   --    --   3.5   --    --    SGOT   --    --   20   --    --    AP   --    --   73   --    --    INR   --   1.2   --   1.2   --      Urine culture: 10/4/2017  No growth    Radiology: images reviewed  CT A/P  10/4/2017  IMPRESSION:     1. The bladder is decompressed, with Riley catheter in situ. Small amount of  intravesicular gas likely secondary to catheter placement. Apparent bladder wall  thickening and perivesicular fat stranding are nonspecific, correlation for  symptoms of urinary infection may be helpful. No evidence of hydronephrosis  involving either kidney.   2. Retroperitoneal and pelvic adenopathy, indeterminate in nature. Correlation  for history of malignancy is recommended. 3. Small hiatal hernia. Diverticulosis without evidence of diverticulitis. 4. 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 5. Calcified pleural plaques likely in keeping with prior asbestos exposure. Patient Vitals for the past 8 hrs:   BP Temp Pulse Resp SpO2 Height Weight   10/06/17 1245 121/87 - 60 - 97 % - -   10/06/17 1230 172/62 - 80 - 95 % - -   10/06/17 1215 143/70 - 73 - 95 % - -   10/06/17 1200 130/44 - 82 18 95 % - -   10/06/17 1145 138/57 - 73 19 95 % - -   10/06/17 1130 - - 73 19 96 % - -   10/06/17 1115 138/64 - 71 17 96 % - -   10/06/17 1100 137/62 - 64 17 95 % - -   10/06/17 1045 148/66 - 65 18 96 % - -   10/06/17 1030 133/62 - 62 15 95 % - -   10/06/17 1015 137/58 - 61 16 96 % - -   10/06/17 1000 134/70 - 62 15 96 % - -   10/06/17 0945 150/53 - 63 22 97 % - -   10/06/17 0930 133/56 - 63 17 96 % - -   10/06/17 0915 118/56 - 64 16 96 % - -   10/06/17 0838 133/65 97.5 °F (36.4 °C) 65 16 97 % 5' 3\" (1.6 m) 167 lb 1.7 oz (75.8 kg)     Temp (24hrs), Av.5 °F (36.4 °C), Min:97.5 °F (36.4 °C), Max:97.5 °F (36.4 °C)      Intake and Output:        Physical Exam:            General:    alert, no distress, appears stated age                     Skin:  no rash or abnormalities   Lungs:  CTAB   CV:   RRR   Lymph nodes:  no inguinal LAD             Abdomen[de-identified]  soft, non-tender. Bowel sounds normal. No masses,  no organomegaly             Genitalia:  penis exam: non focal uncircumcised, saavedra in place draining clear urine on CBI, prostate exam: rock hard through right lobe, left lobe soft.   40 grams in size, asymmetry R>L   Neuro:    No focal deficits          Extremities:  negative       Assessment:     Active Problems:    Hematuria (10/6/2017)      Pt is a 79 y/o WM with gross hematuria   3 way saavedra in place, on CBI  Possible bladder, h/o TURBT in  with negative path  Possible prostate mass, last PSA 13.9 in 2014    Plan:   Check PSA, likely has PCa based on MILLI  LAD is either from mets PCa or bladder CA  Discussed with daughter they should have a family discussion about pt wishes. He is currently full code  No surgical intervention needed at this time, CBI weaned down in ER.     Will see again tomorrow    Signed By: Elmo Leslie MD                         October 6, 2017    Kayley Urbina MD  , Dept of Urology  [facility]  [facility] [place], [address]  Pager #: [phone]

## 2017-10-07 NOTE — PROGRESS NOTES
Urology Progress Note        Assessment/Plan:     Patient Active Problem List   Diagnosis Code    Gross hematuria R31.0    Prostate nodule N40.2    Bladder retention R33.9    Hematuria R31.9         Plan:  titrate CBI to off   Consider VT in AM if cont to be clear. Will need to see uro-onc for decision on TURBT vs IR biopsy of LAD for diagnosis, however prognosis due to age and extent of disease likely poor and treatments may be limited. Verona Alvarez MD      Subjective:     Daily Progress Note: 10/7/2017 1:23 PM    Michelle Daigle is doing fair. His urine has cleared with no clots or other new issues. He doesn't answer questions but family at bedside. Objective:     Visit Vitals    /61 (BP 1 Location: Left arm, BP Patient Position: At rest)    Pulse (!) 57    Temp 97.2 °F (36.2 °C)    Resp 16    Ht 5' 2\" (1.575 m)    Wt 144 lb 10 oz (65.6 kg)    SpO2 95%    BMI 26.45 kg/m2        Temp (24hrs), Av.5 °F (36.4 °C), Min:97.1 °F (36.2 °C), Max:98.4 °F (36.9 °C)      Intake and Output:  10/05 1901 - 10/07 0700  In: 4458.3 [P.O.:1080; I.V.:1128.3]  Out: 5800 [Urine:5800]  10/07 0701 - 10/07 1900  In: 3800 [P.O.:50]  Out: -     PHYSICAL EXAMINATION:   Visit Vitals    /61 (BP 1 Location: Left arm, BP Patient Position: At rest)    Pulse (!) 57    Temp 97.2 °F (36.2 °C)    Resp 16    Ht 5' 2\" (1.575 m)    Wt 144 lb 10 oz (65.6 kg)    SpO2 95%    BMI 26.45 kg/m2     Constitutional: Well developed, well nourished. No acute distress. HEENT: Normocephalic, Atraumatic, EOM's intact   CV:  RRR  Respiratory: No respiratory distress or difficulties breathing   Abdomen:  Soft, non-tender, non-distended   Male:   Clear UOP in tube, mod CBI  Skin: No evidence of jaundice.   Normal color  Neuro/Psych:  Alert       Labs:     Labs: Results:   Chemistry    Recent Labs      10/07/17   0740  10/06/17   1235  10/05/17   1642   GLU  58*  91  90   NA  137  137  137   K  3.7  3.8  4.2 CL  108  106  105   CO2  21  23  21   BUN  28*  34*  31*   CREA  0.95  1.23  1.35*   CA  8.6  8.7  8.9   AGAP  8  8  11   BUCR  29*  28*  23*   AP  58   --   73   TP  6.2*   --   6.9   ALB  2.8*   --   3.5   GLOB  3.4   --   3.4   AGRAT  0.8   --   1.0      CBC w/Diff Recent Labs      10/07/17   0740  10/06/17   0933  10/05/17   1642   WBC  7.8  10.9  11.7   RBC  3.70*  4.11*  4.02*   HGB  12.4*  14.0  13.7   HCT  36.5  40.5  39.2   PLT  145  158  198   GRANS  72  80*  77*   LYMPH  16*  9*  11*   EOS  2  1  1      Cultures Recent Labs      10/06/17   0949  10/04/17   1415   CULT  NO GROWTH 1 DAY  NO GROWTH 2 DAYS     All Micro Results     Procedure Component Value Units Date/Time    CULTURE, URINE [[id number]] Collected:  10/06/17 0949    Order Status:  Completed Specimen:  Clean catch Updated:  10/07/17 0936     Special Requests: NO SPECIAL REQUESTS        Culture result: NO GROWTH 1 DAY               Urinalysis Color   Date Value Ref Range Status   10/06/2017 YELLOW   Final     Appearance   Date Value Ref Range Status   10/06/2017 BLOODY   Final     Specific gravity   Date Value Ref Range Status   10/06/2017 1.028 1.005 - 1.030   Final     pH (UA)   Date Value Ref Range Status   10/06/2017 5.5 5.0 - 8.0   Final     Protein   Date Value Ref Range Status   10/06/2017 >1000 (A) NEG mg/dL Final     Ketone   Date Value Ref Range Status   10/06/2017 15 (A) NEG mg/dL Final     Bilirubin   Date Value Ref Range Status   10/06/2017 NEGATIVE  NEG   Final     Blood   Date Value Ref Range Status   10/06/2017 LARGE (A) NEG   Final     Urobilinogen   Date Value Ref Range Status   10/06/2017 1.0 0.2 - 1.0 EU/dL Final     Nitrites   Date Value Ref Range Status   10/06/2017 NEGATIVE  NEG   Final     Leukocyte Esterase   Date Value Ref Range Status   10/06/2017 MODERATE (A) NEG   Final     Potassium   Date Value Ref Range Status   10/07/2017 3.7 3.5 - 5.5 mmol/L Final     Creatinine   Date Value Ref Range Status   10/07/2017 0.95 0.6 - 1.3 MG/DL Final     BUN   Date Value Ref Range Status   10/07/2017 28 (H) 7.0 - 18 MG/DL Final     Prostate Specific Ag   Date Value Ref Range Status   10/06/2017 231.0 (H) 0.0 - 4.0 ng/mL Final     Comment:     New method in use, please reestablish patient baseline      PSA Recent Labs      10/06/17   1932   PSA  231.0*      Coagulation Lab Results   Component Value Date/Time    Prothrombin time 14.4 10/06/2017 09:33 AM    Prothrombin time 15.0 10/04/2017 05:00 PM    INR 1.2 10/06/2017 09:33 AM    INR 1.2 10/04/2017 05:00 PM    aPTT 29.8 10/06/2017 09:33 AM    aPTT 30.3 10/04/2017 05:00 PM

## 2017-10-07 NOTE — PROGRESS NOTES
Problem: Discharge Planning  Goal: *Discharge to safe environment  Outcome: Progressing Towards Goal  Home possibly with home health

## 2017-10-07 NOTE — PROGRESS NOTES
Problem: Falls - Risk of  Goal: *Absence of Falls  Document Harjinder Fall Risk and appropriate interventions in the flowsheet.    Outcome: Progressing Towards Goal  Fall Risk Interventions:  Mobility Interventions: Patient to call before getting OOB     Mentation Interventions: Door open when patient unattended, More frequent rounding           Elimination Interventions: Call light in reach

## 2017-10-07 NOTE — PROGRESS NOTES
Patient has designated daughter to participate in his/her discharge plan and to receive any needed information.      Name: Rocky Beasley  Address:  Phone number: [phone]

## 2017-10-07 NOTE — PROGRESS NOTES
[facility]   Discharge Planning/ Assessment    Reasons for Intervention: Initial discharge planning interview. Face sheet data reviewed with pt's daughter. Pt currently confused. All information confirmed as correct except:pt's listed emergency contact is actually his daughter not his sister as listed on face sheet. Her cell phone is : [phone]. Pt lives with his son. Daughter does shopping and many chores for pt. Daughter states pt has very mild memory issues and is normally independent at home with a cane and able to do ADL's without assistance. He has w/c, walker, cane, elevated toilet seat, and shower grab bars at home. Pt with Medicare A&B primary and [employer] secondary. He currently has no PCP. Daughter has accepted  service for help obtaining one for pt.  referral placed. Anticipated discharge plan is home with possible home health if pt becomes deconditioned while hospitalized as he is 80 yrs old.        High Risk Criteria  [] Yes  [x]No   Physician Referral  [] Yes  []No        Date    Nursing Referral  [] Yes  []No        Date    Patient/Family Request  [] Yes  []No        Date       Resources:    Medicare  [x] Yes  []No   Medicaid  [] Yes  []No   No Resources  [] Yes  []No   Private Insurance  [x] Yes  []No    Name/Phone Number    Other  [] Yes  []No        (i.e. Workman's Comp)         Prior Services:    Prior Services  [] Yes  []No   Home Health  [] Yes  []No   [address] [place]  [] Yes  []No        Number of [address]  [] Yes  []No       Meals on Wheels  [] Yes  []No   Office on Aging  [] Yes  []No   Transportation Services  [] Yes  []No   Nursing Home  [] Yes  []No        Nursing Home Name    [address]  [] Yes  []No        [address] Name    Other       Information Source:      Information obtained from  [] Patient  [] Parent   [] Guardian  [x] Child  [] Spouse   [] Significant Other/Partner [] Friend      [] EMS    [] Nursing Home Chart          [] Other:   Chart Review  [x] Yes  []No     Family/Support System:    Patient lives with  [] Alone    [] Spouse   [] Significant Other  [x] Children  [] Caretaker   [] Parent  [] Sibling     [] Other       Other Support System:    Is the patient responsible for care of others  [] Yes  [x]No   Information of person caring for patient on  discharge    Managers financial affairs independently  [x] Yes  []No   If no, explain:      Status Prior to Admission:    Mental Status  [x] Awake  [x] Alert  [] Oriented  [] Quiet/Calm [] Lethargic/Sedated   [] Disoriented  [] Restless/Anxious  [] Combative   Personal Care  [] Dependent  [x] [address]  [] Requires Assistance   Meal Preparation Ability  [] Independent   [] Standby Assistance   [] Minimal Assistance   [x] Moderate Assistance  [] Maximum Assistance     [] Total Assistance   Chores  [] Independent with Chores   [] N/A Nursing Home Resident   [x] Requires Assistance   Bowel/Bladder  [] Continent  [] Catheter  [] Incontinent  [] Ostomy Self-Care    [] Urine Diversion Self-Care  [] Maximum Assistance     [] Total Assistance   Number of Persons needed for assistance    DME at home  [] Aayush Cardenas  [x] Jose Cardenas   [] Commode    [x] Bathroom/Grab Bars  [] Hospital Bed  [] Nebulizer  [] Oxygen           [x] Raised Toilet Seat  [] Shower Chair  [] Side Rails for Bed   [] Tub Transfer Bench   [] Vee Good  [] Linwood Sicard, Standard      [x] Other:w/c   Vendor      Treatment Presently Receiving:    Current Treatments  [] Chemotherapy  [] Dialysis  [] Insulin  [] IVAB [] IVF   [] O2  [] PCA   [] PT   [] RT   [] Tube Feedings   [] Wound Care     Psychosocial Evaluation:    Verbalized Knowledge of Disease Process  [] Patient  []Family   Coping with Disease Process  [] Patient  []Family   Requires Further Counseling Coping with Disease Process  [] Patient  []Family     Identified Projected Needs:    Home Health Aid  [] Yes  []No   Transportation  [] Yes  []No   Education  [] Yes  []No        Specific Education     Financial Counseling  [] Yes  []No   Inability to Care for Self/Will Require 24 hour care  [] Yes  []No   Pain Management  [] Yes  []No   Home Infusion Therapy  [] Yes  []No   Oxygen Therapy  [] Yes  []No   DME  [] Yes  []No   Long Term Care Placement  [] Yes  []No   Rehab  [] Yes  []No   Physical Therapy  [x] Yes  []No   Needs Anticipated At This Time  [x] Yes  []No     Intra-Hospital Referral:    [facility]  [] Yes  []No     [] Yes  []No   Patient Representative  [] Yes  []No   Staff for Teaching Needs  [] Yes  []No   Specialty Teaching Needs     Diabetic Educator  [] Yes  []No   Referral for Diabetic Educator Needed  [] Yes  []No  If Yes, place order for Nutritionist or Diabetic Consult     Tentative Discharge Plan:    Home with No Services  [x] Yes  []No   Home with Home Health Follow-up  [x] Yes  []No        If Yes, specify type    Home Care Program  [] Yes  []No        If Yes, specify type    Meals on Wheels  [] Yes  []No   Office of Aging  [] Yes  []No   NHP  [] Yes  []No   Return to the Nursing Home  [] Yes  []No   Rehab Therapy  [] Yes  []No   Acute Rehab  [] Yes  []No   Subacute Rehab  [] Yes  []No   Private Care  [] Yes  []No   Substance Abuse Referral  [] Yes  []No   Transportation  [] Yes  []No   Chore Service  [] Yes  []No   Inpatient Hospice  [] Yes  []No   OP RT  [] Yes  [] No   OP Hemo  [] Yes  [] No   OP PT  [] Yes  []No   Support Group  [] Yes  []No   Reach to Recovery  [] Yes  []No   OP Oncology Clinic  [] Yes  []No   Clinic Appointment  [] Yes  []No   DME  [] Yes  []No   Comments    Name of D/C Planner or  Given to Patient or [facility], GLORIA Chamberlain [address]  Ph: [phone]  Pager: [phone]   Phone Number         Extension    Date 10/7/17   Time    If you are discharged home, whom do you designate to participate in your discharge plan and receive any information needed?      Enter name of Sarah Camarillo- daughter    Phone number: [phone]          Phone # of designee         Address of designee         Updated         Patient refused to designate any           individual

## 2017-10-07 NOTE — PROGRESS NOTES
Progress Note      Patient: Joanna Thomason               Sex: male          DOA: 10/6/2017       YOB: 1919      Age:  80 y.o.        LOS:  LOS: 1 day               Subjective:   Discussed with urology . The ct scan shows extensive retroperitoneal  and pelvic adenopathy  The pt's bleeding has almost resolved . The plan will be to try to dc the pt in the am he is not a good candidate for chemotherapy       Objective:      Visit Vitals    /64 (BP 1 Location: Left arm, BP Patient Position: At rest)    Pulse 67    Temp 98.2 °F (36.8 °C)    Resp 18    Ht 5' 2\" (1.575 m)    Wt 65.6 kg (144 lb 10 oz)    SpO2 91%    BMI 26.45 kg/m2       Physical Exam:  Pt is having episodes of confusion  Heart reg rate and rhythm   Lungs fair breath sounds heard   Abdomen soft and no pain on palpation a saavedra is in place   Neuro pt is having confusion         Lab/Data Reviewed:  CMP:   Lab Results   Component Value Date/Time     10/07/2017 07:40 AM    K 3.7 10/07/2017 07:40 AM     10/07/2017 07:40 AM    CO2 21 10/07/2017 07:40 AM    AGAP 8 10/07/2017 07:40 AM    GLU 58 (L) 10/07/2017 07:40 AM    BUN 28 (H) 10/07/2017 07:40 AM    CREA 0.95 10/07/2017 07:40 AM    GFRAA >60 10/07/2017 07:40 AM    GFRNA >60 10/07/2017 07:40 AM    CA 8.6 10/07/2017 07:40 AM    ALB 2.8 (L) 10/07/2017 07:40 AM    TP 6.2 (L) 10/07/2017 07:40 AM    GLOB 3.4 10/07/2017 07:40 AM    AGRAT 0.8 10/07/2017 07:40 AM    SGOT 18 10/07/2017 07:40 AM    ALT 12 (L) 10/07/2017 07:40 AM     CBC:   Lab Results   Component Value Date/Time    WBC 7.8 10/07/2017 07:40 AM    HGB 12.4 (L) 10/07/2017 07:40 AM    HCT 36.5 10/07/2017 07:40 AM     10/07/2017 07:40 AM           Assessment/Plan     Active Problems:    Hematuria (10/6/2017) with larger retroperitoneal and pelvic adenopathy . 90 pack year h/o cigarette smoking  confusion    Plan: vishal try to dc the pt in the am when cleared by urology .

## 2017-10-07 NOTE — PROGRESS NOTES
2010 Received shift report from Alonzo Bauer RN. Pt lying in bed with no complaints. Bed in lowest position, call bell within reach, will continue to monitor. 2045 Pt alert and oriented x4. Pt [place] and has difficulty seeing. Riley is draining and CBI is going without difficulty. 0100 Pt lying in bed sleeping with no complaints. 0230 Hung new bag of CBI.    0430 Pt lying in bed with no complaints. 0700 Pt lying in bed with no complaints. Bedside and Verbal shift change report given to Twin Moreno RN (oncoming nurse) by Trista Graham RN (offgoing nurse). Report included the following information SBAR, Kardex, Intake/Output, MAR and Recent Results.

## 2017-10-07 NOTE — ROUTINE PROCESS
2010 Bedside and Verbal shift change report given to arpita (oncoming nurse) by Gladys Carlson RN   (offgoing nurse). Report included the following information Kardex, MAR and Recent Results.

## 2017-10-07 NOTE — PROGRESS NOTES
0740 - received pt in room. Pt resting in bed. AO. Pain rated 0/10.    0930 - assessment completed. Pt is AOx4. VSS. CBI running pink to clear. Call bell at bedside. No distress noted. 1120 - observed family members at bedside. Pt resting in bed.     1355 - observed pt receiving spa bath. 1630 - pt resting in bed. Family at bedside    1800 - pt adjusted up in bed. Pt attempts to place his leg off side of bed, continues to ask family for his shoes. Will continue to monitor.

## 2017-10-08 NOTE — PROGRESS NOTES
Progress Note      Patient: Lilian Frances               Sex: male          DOA: 10/6/2017       YOB: 1919      Age:  80 y.o.        LOS:  LOS: 2 days               Subjective:   Pt is confused and intermittently agitated he pulled out his saavedra catheter and it has been difficult for there nurses to measure his urine residual as he will not cooperate . he is probably too confused to send home . Will also need to assess his urine residual .may need to put back the saavedra catheter if he cannot urinate he is not a candidate for chemotherapy      Objective:      Visit Vitals    /69 (BP 1 Location: Left arm, BP Patient Position: At rest)    Pulse 72    Temp 97.5 °F (36.4 °C)    Resp 15    Ht 5' 2\" (1.575 m)    Wt 65.6 kg (144 lb 10 oz)    SpO2 90%    BMI 26.45 kg/m2       Physical Exam:  Pt was seen today and he was disoriented   Heart reg rate and rhythm   Lungs good breath sounds heard   Abdomen soft and mildly tender to palpation   Neuro confused       Lab/Data Reviewed:  CMP:   Lab Results   Component Value Date/Time     10/08/2017 03:00 AM    K 3.6 10/08/2017 03:00 AM     10/08/2017 03:00 AM    CO2 22 10/08/2017 03:00 AM    AGAP 9 10/08/2017 03:00 AM    GLU 83 10/08/2017 03:00 AM    BUN 21 (H) 10/08/2017 03:00 AM    CREA 0.82 10/08/2017 03:00 AM    GFRAA >60 10/08/2017 03:00 AM    GFRNA >60 10/08/2017 03:00 AM    CA 8.4 (L) 10/08/2017 03:00 AM    ALB 3.0 (L) 10/08/2017 03:00 AM    TP 6.1 (L) 10/08/2017 03:00 AM    GLOB 3.1 10/08/2017 03:00 AM    AGRAT 1.0 10/08/2017 03:00 AM    SGOT 29 10/08/2017 03:00 AM    ALT 20 10/08/2017 03:00 AM     CBC:   Lab Results   Component Value Date/Time    WBC 9.5 10/08/2017 03:00 AM    HGB 13.7 10/08/2017 03:00 AM    HCT 38.6 10/08/2017 03:00 AM     10/08/2017 03:00 AM           Assessment/Plan     Active Problems:    Hematuria (10/6/2017) pt has significant retroperitoneal adenopathy.  He probably has bladder cancer he is not a candidate for aggressive chemotherapy   90 pack year h/o cigarette smoking   Confusion       Plan:will try top obtain a urine residual and also an h/h to se if he has dropped his hemoglobin. he is not yet ready for dc

## 2017-10-08 NOTE — PROGRESS NOTES
Problem: Falls - Risk of  Goal: *Absence of Falls  Document Harjinder Fall Risk and appropriate interventions in the flowsheet.    Outcome: Progressing Towards Goal  Fall Risk Interventions:  Mobility Interventions: Patient to call before getting OOB     Mentation Interventions: Door open when patient unattended           Elimination Interventions: Call light in reach

## 2017-10-08 NOTE — PROGRESS NOTES
1915 Pt received after report given by Verona Briseno RN. CBI with pale pink urine in saavedra bag. Pt confused some difficulty following commands to stay in bed. Pulling off O2   2115 Saavedra with red colored urine. One small clot noted in tubing. Continue with CBI  2200 Pt pulled off his ID band  2225 Pt remains awake. Needs constant redirection to keep on track. Urine in saavedra tube remains red. No further clots noted  2320 Pt oob standing next to the bed. Pt assisted back to bed. Saavedra remains red in the tubing. Increased CBI several different size blood clots came out. Decrease CBI   Then urine clear. Continue to monitor. 0015 Pt attempting to get oob. Needs constant redirection. Saavedra draining clear pale pink urine with small clots. Pulling off O2  0030 Turned CBI off  0101 Pt resting in bed right leg put back up on to the bed  0200 Pt had bent the saavedra tubing with his hand. Pt attempting to kick staff. Able to release bend in tubing urine flow with different size clots with pink urine. Restarted CBI at a low rate. 0240 Urine clear stopped CBI. Pt talking about his daughter and his money. Talked about his son that was a  that was shot in the face. 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 Pt switching from past to present. Speech clear. 0350 Increase in confusion. 0445 Pt cleaned and repositioned  0515 Pt pulling at penis and saavedra tubeing. Bloody urine. Restarted CBI  0710 Pt remains confused pulling at saavedra. Urine remains bloody    Bedside and Verbal shift change report given to Kristine Dimas Dr (oncoming nurse) by Chad Villafana (offgoing nurse). Report included the following information SBAR, Kardex and Recent Results.

## 2017-10-08 NOTE — PROGRESS NOTES
0745 - pt found sitting up at side of bed with gown off, states \"I'm going home. \" Reacclimated pt to room, place and situation. Pt verbalized full name and . Gown changed, pt assisted back into bed. Pt continues to pull at catheter and move around in bed. Pt rates pain 0/10.     0815 - pt refuses to eat when offered. Resting quietly. No distress noted. 900 - pt found in bed with saavedra catheter partially pulled out, dc'd catheter as pt was in pain when it was moved. Injury noticed as bloody urine began to form. 930 - paged urology to advise of pt's condition. 6635 - received call back from Dr. Cydney Villar, gave update. Will advise if pt is not able to void. 45 - observed pt receiving a spa bath. No distress noted. 111 - observed Dr. Kristie Almeida rounding in room. Pt resting quietly, confused. 1145 - pt's family now at bedside, pt is very fidgety and beginning to swat in the air and pulling on his gown, sheets and blanket. 1420 - contacted Dr. Kristie Almeida, pt becoming combative. Received order for ativan 0.25mgm now and [id number] additional time if needed. [phone] - observed pt sleeping soundly, pt had an episode of incontinent urine with bloody discharge d/t earlier trauma of pulling out catheter.

## 2017-10-08 NOTE — ROUTINE PROCESS
Bedside and Verbal shift change report given to Misbah Green RN (oncoming nurse) by Beryle Heller, RN(offgoing nurse). Report included the following information SBAR, Kardex and MAR.

## 2017-10-08 NOTE — PROGRESS NOTES
Problem: Falls - Risk of  Goal: *Absence of Falls  Document Harjinder Fall Risk and appropriate interventions in the flowsheet.    Outcome: Progressing Towards Goal  Fall Risk Interventions:  Mobility Interventions: Bed/chair exit alarm     Mentation Interventions: Door open when patient unattended, More frequent rounding, Family/sitter at bedside           Elimination Interventions: Patient to call for help with toileting needs, Bed/chair exit alarm

## 2017-10-08 NOTE — PROGRESS NOTES
Bedside and Verbal shift change report given to Scarlett Ask (oncoming nurse) by Elias Gamble RN(offgoing nurse). Report included the following information SBAR, Kardex and MAR.

## 2017-10-09 PROBLEM — C61 PROSTATE CANCER (HCC): Status: RESOLVED | Noted: 2017-01-01 | Resolved: 2017-01-01

## 2017-10-09 PROBLEM — R53.81 DEBILITY: Status: ACTIVE | Noted: 2017-01-01

## 2017-10-09 PROBLEM — C61 PROSTATE CANCER (HCC): Status: ACTIVE | Noted: 2017-01-01

## 2017-10-09 PROBLEM — R41.82 ALTERED MENTAL STATUS: Status: ACTIVE | Noted: 2017-01-01

## 2017-10-09 NOTE — PROGRESS NOTES
Met with the pt's Franky Mathis # [phone] to discuss home hospice care and offer FOC. She chose [facility]. # [phone]. Dgt verified the contact information on the face sheet is correct. Dgt reported she will be the primary caregiver for the pt. Pt will need a hospital bed delivered before transport. Referral sent to intake via [facility] and called to the [address] [place] [address] for f/u. Included the M for [facility] to be contacted as soon as possible [phone].

## 2017-10-09 NOTE — ACP (ADVANCE CARE PLANNING)
Daughter Cole Burkett and son Weston signed POST form to reflect patient wishes for DNR/DNI, comfort measures, and no alternative means of feeding (Peg or NG). Placed on chart for scanning and copies provided to family.

## 2017-10-09 NOTE — PROGRESS NOTES
Urology Progress Note        Assessment/Plan:     Patient Active Problem List   Diagnosis Code    Gross hematuria R31.0    Prostate nodule N40.2    Bladder retention R33.9    Hematuria R31.9    Altered mental status R41.82        Pelvic and abdominal lymphadenopathy      Plan:  Patient has become comfort measures only per family request.  Unfortunately patient is requiring intermittent caths to empty bladder and has returning clots. Family aware of bladder drainage difficulties. Parag Samuel MD      Subjective:     Daily Progress Note: 10/9/2017 4:55 PM    Milton Eldridge is doing poor. He reports pain is absent except when bladder is distended. He has no new complaints. He is poor and unable to get out of bed. Urine appearance is bloody. Objective:     Visit Vitals    BP 99/51 (BP 1 Location: Left arm, BP Patient Position: At rest)    Pulse 63    Temp 97.5 °F (36.4 °C)    Resp 18    Ht 5' 2\" (1.575 m)    Wt 144 lb 10 oz (65.6 kg)    SpO2 94%    BMI 26.45 kg/m2        Temp (24hrs), Av.7 °F (36.5 °C), Min:97.4 °F (36.3 °C), Max:98 °F (36.7 °C)      Intake and Output:  10/07 1901 - 10/09 0700  In: 500   Out: 2750 [Urine:2750]  10/09 0701 - 10/09 190  In: -   Out: 800 [Urine:800]    PHYSICAL EXAMINATION:   Visit Vitals    BP 99/51 (BP 1 Location: Left arm, BP Patient Position: At rest)    Pulse 63    Temp 97.5 °F (36.4 °C)    Resp 18    Ht 5' 2\" (1.575 m)    Wt 144 lb 10 oz (65.6 kg)    SpO2 94%    BMI 26.45 kg/m2     Constitutional: Well developed, well nourished. No acute distress. HEENT: Normocephalic, Atraumatic, EOM's intact   CV:  RRR  Respiratory: No respiratory distress or difficulties breathing   Abdomen:  Soft, non-tender, non-distended   Male:   CVA tenderness: absent          Prostate: deferred but hard nodule per dr. Som Huntley. SCROTUM:  noormal         PENIS: normal  Skin: No evidence of jaundice. pale appearance.   Neuro/Psych:  Confused but calm.. Affect appropriate. Labs:     Labs: Results:   Chemistry    Recent Labs      10/09/17   0730  10/08/17   0300  10/07/17   0740   GLU  84  83  58*   NA  138  136  137   K  3.7  3.6  3.7   CL  108  105  108   CO2  17*  22  21   BUN  27*  21*  28*   CREA  1.07  0.82  0.95   CA  8.5  8.4*  8.6   AGAP  13  9  8   BUCR  25*  26*  29*   AP  65  67  58   TP  6.8  6.1*  6.2*   ALB  2.9*  3.0*  2.8*   GLOB  3.9  3.1  3.4   AGRAT  0.7*  1.0  0.8      CBC w/Diff Recent Labs      10/09/17   0730  10/08/17   1530  10/08/17   0300  10/07/17   0740   WBC  9.9   --   9.5  7.8   RBC  4.17*   --   4.06*  3.70*   HGB  14.1  13.6  13.7  12.4*   HCT  40.5  38.8  38.6  36.5   PLT  224   --   202  145   GRANS  69   --   73  72   LYMPH  17*   --   14*  16*   EOS  2   --   1  2      Cultures No results for input(s): CULT in the last 72 hours.   All Micro Results     Procedure Component Value Units Date/Time    CULTURE, URINE [[id number]] Collected:  10/06/17 0949    Order Status:  Completed Specimen:  Clean catch Updated:  10/07/17 0936     Special Requests: NO SPECIAL REQUESTS        Culture result: NO GROWTH 1 DAY               Urinalysis Color   Date Value Ref Range Status   10/06/2017 YELLOW   Final     Appearance   Date Value Ref Range Status   10/06/2017 BLOODY   Final     Specific gravity   Date Value Ref Range Status   10/06/2017 1.028 1.005 - 1.030   Final     pH (UA)   Date Value Ref Range Status   10/06/2017 5.5 5.0 - 8.0   Final     Protein   Date Value Ref Range Status   10/06/2017 >1000 (A) NEG mg/dL Final     Ketone   Date Value Ref Range Status   10/06/2017 15 (A) NEG mg/dL Final     Bilirubin   Date Value Ref Range Status   10/06/2017 NEGATIVE  NEG   Final     Blood   Date Value Ref Range Status   10/06/2017 LARGE (A) NEG   Final     Urobilinogen   Date Value Ref Range Status   10/06/2017 1.0 0.2 - 1.0 EU/dL Final     Nitrites   Date Value Ref Range Status   10/06/2017 NEGATIVE  NEG   Final     Leukocyte Esterase Date Value Ref Range Status   10/06/2017 MODERATE (A) NEG   Final     Potassium   Date Value Ref Range Status   10/09/2017 3.7 3.5 - 5.5 mmol/L Final     Creatinine   Date Value Ref Range Status   10/09/2017 1.07 0.6 - 1.3 MG/DL Final     BUN   Date Value Ref Range Status   10/09/2017 27 (H) 7.0 - 18 MG/DL Final     Prostate Specific Ag   Date Value Ref Range Status   10/06/2017 231.0 (H) 0.0 - 4.0 ng/mL Final     Comment:     New method in use, please reestablish patient baseline      PSA Recent Labs      10/06/17   1932   PSA  231.0*      Coagulation Lab Results   Component Value Date/Time    Prothrombin time 14.4 10/06/2017 09:33 AM    Prothrombin time 15.0 10/04/2017 05:00 PM    INR 1.2 10/06/2017 09:33 AM    INR 1.2 10/04/2017 05:00 PM    aPTT 29.8 10/06/2017 09:33 AM    aPTT 30.3 10/04/2017 05:00 PM            Addendum:  Discussed with Palliative care, Dr. Oma Isaac. We will figure this is metastatic prostate cancer for now. Will start Avodart to hopefully decrease prostate bleeding and have asked nurses to use larger cathter to hopefully better drain bladder and any clots.     Reji Pratt MD

## 2017-10-09 NOTE — PROGRESS NOTES
Received bedside shift report from [facility]. Patient is resting in bed, pt is confused, calm and cooperative, NAD noted, bed at the lowest position with wheels locked, call bell within reach. 0825 Patient BP is high, rechecked BP, Dr Ibrahim is in pt's room,notified him, pt is complaining of pain on his lower abdomen, got the report from night nurse that pt has been voiding, Dr Ibrahim gave verbal order to do bladder scan, bladder scan shows 614 ml, no new order received, he is going to put the order for other BP medicine for the pt.    0908 Patient is resting in bed, NAD noted,  daughter at bedside, morning med administered, pt swallowed pills without any difficulties, call bell within reach, will continue to monitor him. 1040 Pt has voided earlier, Bladder scan is done,residual is 617 ml, received verbal with read back order from Dr Soria for straight catheter. 1050 Straight cathter performed by GLORIA Parker, drained 400 ml bloody urine, pt also passed some clots, Dr Soria is in room and she is aware of that. 1130 Patient is sleeping at this time, NAD noted, bed at the lowest position, call bell within reach, will continue to monitor him. 1400 Pt is sleeping with eyes closed, NAD noted, unlabored breathing, bed at the lowest position, call bell within reach, will continue to monitor him. 1555 Patient wants to void, put him on bedpan, couldn't able to void, pt complaining of pain when he attempts to void, bladder scan is done, 549 ml.    1606 Straight catheter is done by Jovita Gandhi). Nurse stating that pt had clots that blocking urine to came out, she flushed catheter with 20 ml of NS, was able to drain 350 ml of bloody urine. 1700 Received verbal with read back order from Dr Marcela Osler for flushing straight catheter with 60 ml of NS.    1800 Pt is resting with NAD, families in room, call bell within reach, will continue to monitor him.     Bedside and Verbal shift change report given to Vito Sandoval (oncoming nurse) by Sapna Devi (offgoing nurse). Report included the following information SBAR, Kardex, OR Summary, Intake/Output and MAR.

## 2017-10-09 NOTE — PROGRESS NOTES
Care Management Interventions  PCP Verified by CM:  Yes  Palliative Care Criteria Met (RRAT>21 & CHF Dx)?: No  Transition of Care Consult (CM Consult): Ernie: Yes  Discharge Durable Medical Equipment: No  Physical Therapy Consult: No  Occupational Therapy Consult: No  Speech Therapy Consult: No  Current Support Network: [address] [place] [address] discussed with Pt/Family/Caregiver: Yes  Freedom of Choice Offered: Yes  Discharge Location  Discharge Placement: Home with home health

## 2017-10-09 NOTE — PROGRESS NOTES
Call to the pt's dgt, Jose, no answer. [facility] with contact number for return call.

## 2017-10-09 NOTE — CDMP QUERY
Please clarify if this patient is being treated/managed for:    => UTI    =>Other Explanation of clinical findings  =>Unable to Determine (no explanation of clinical findings)    The medical record reflects the following:    Risk: 81 yo w/PMH prostate CA, HTN, hematuria    Clinical Indicators: UA results  Color: YELLOW   Appearance: BLOODY  Specific gravity: 1.028  pH (UA): 5.5   Protein: >1000 (A) Glucose: NEGATIVE  Ketone: 15 (A)  Blood: LARGE (A)  Bilirubin: NEGATIVE  Urobilinogen: 1.0  Nitrites: NEGATIVE   Leukocyte Esterase: MODERATE (A)  Epithelial cells: FEW   WBC: 4 to 10  RBC: TOO NUMEROUS TO C... Bacteria: 4+ (A)    Treatment: Levaquin, CBI    Please clarify and document your clinical opinion in the progress notes and discharge summary including the definitive and/or presumptive diagnosis, (suspected or probable), related to the above clinical findings. Please include clinical findings supporting your diagnosis. If you DECLINE this query or would like to communicate with [facility], please utilize the \"[facility] message box\" at the TOP of the Progress Note on the right.       Thank you,  Luis M New RN [facility]  [phone]

## 2017-10-09 NOTE — PROGRESS NOTES
Problem: Falls - Risk of  Goal: *Absence of Falls  Document Harjinder Fall Risk and appropriate interventions in the flowsheet.    Outcome: Progressing Towards Goal  Fall Risk Interventions:  Mobility Interventions: Communicate number of staff needed for ambulation/transfer     Mentation Interventions: Door open when patient unattended           Elimination Interventions: Call light in reach, Patient to call for help with toileting needs

## 2017-10-09 NOTE — PROGRESS NOTES
Patient does not have long term care insurance and therefore is not eligible for admission under [address]. His family would have to agrees to spend down the patient assets to qualify him for [address]. He is unable to participate with therapy and therefore does not have a skilled need to entitle him to a SNF, even so I have requested an evaluation by therapy to determine his ability. His discharge plan is to return home with home care as indicated. I have left an FYI for the physician informing him of the circumstances regarding this patient discharge. He lives with his son and has the additional support of his daughter.  Dane Sicard, RN

## 2017-10-09 NOTE — PROGRESS NOTES
Palliative care has been consulted. Patients family has agreed to home hospice. Patient is now a DNR.  Jaun Red RN
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
## 2017-10-09 NOTE — PROGRESS NOTES
Daily Progress Note: 10/9/2017 7:08 AM   Admit Date: 10/6/2017    Patient seen in follow up for multiple medical problems as listed below:  Patient Active Problem List   Diagnosis Code    Gross hematuria R31.0    Prostate nodule N40.2    Bladder retention R33.9    Hematuria R31.9       Assesment     80 y.o.  male with HTN, smoking history, COPD and recent urinary issues who was admitted [facility], gross hematuria and clots. This was his third ER visit  for hematuria however no prior issues. He had a saavedra in place and apparently pulled on it due to confusion which caused some more bleeding. Urology consulted. A 26 Fr 3 way saavedra was placed in the ER and CBI was started. He has a h/o of bladder tumor that was resected by Dr. Breanna Chen several years (2010) but pathology was benign, showed chronic inflammation.     His daughter reported he has had a poor appetite for months, comments that he has lost weight over the last 6 months but unsure how much. CT scan showed a bladder mass on right lateral wall with extensive pelvic and retroperitoneal LAD measuring up to 5 cm. His urine returned to clear and his CBI stopped and saavedra removed over the weekend however he has had intermittent delerium. Hematuria - 3rd occasion. S/p CBI. Pulled saavedra at home. Urology consulting  Urinary retension - 615cc on bladder scan 10/9 am may need to re-place saavedra. Acute vs chronic  Bladder mass with pelvic LAD - discuss code/hospice status. Hx of bladder tumor  MING - Cr 0.8 to 1.3 now resolving. Restart Acei  Delerium due to hospital stay  Dementia -mild-moderate by history  HTN - uncontrolled. On norvasc, lisinopril, hctz  Hx of smoking  Hx of pulmonary nodules  The dwindles    DVT Protocol Active: yes  Code Status:  Full Code     Disposition: Hospice? States he lives with son. Subjective:     CC: Urinary Catheter Problem    Interval History: c/o bladder discomfort.  Bladder scan with 615cc however nurses report he has been voiding. Patient states he doesent want to void now because it burns. Hypertensive restarting lisinopril. ROS: 11 point ROS negative except for bladder discomfort    Objective:     Visit Vitals    /69 (BP 1 Location: Right arm, BP Patient Position: At rest)    Pulse 60    Temp 97.4 °F (36.3 °C)    Resp 18    Ht 5' 2\" (1.575 m)    Wt 65.6 kg (144 lb 10 oz)    SpO2 90%    BMI 26.45 kg/m2       Temp (24hrs), Av.6 °F (36.4 °C), Min:97.4 °F (36.3 °C), Max:98 °F (36.7 °C)        Intake/Output Summary (Last 24 hours) at 10/09/17 0708  Last data filed at 10/08/17 1029   Gross per 24 hour   Intake                0 ml   Output              600 ml   Net             -600 ml       Gen: AOx1-2, NAD  HEENT:  WILLIAN, EOMI. Neck: No Bruits/JVD   Lungs:   CTAB. Good respiratory effort  Heart:   RR S1 S2 without M/R/G  Abdomen: ND,NT, BSX4,   Extremities:   No LE edema. No cyanosis.   Skin:  no jaundice/lesions      Data Review:     Meds/Labs/Tests reviewed    Current Shift:     Last three shifts:  10/07 1901 - 10/09 07  In: 500   Out: 2750 [Urine:2750]  Recent Labs      10/08/17   1530  10/08/17   0300  10/07/17   0740  10/06/17   0933   WBC   --   9.5  7.8  10.9   RBC   --   4.06*  3.70*  4.11*   HGB  13.6  13.7  12.4*  14.0   HCT  38.8  38.6  36.5  40.5   PLT   --   202  145  158   GRANS   --   73  72  80*   LYMPH   --   14*  16*  9*   EOS   --   1  2  1       Recent Labs      10/08/17   0300  10/07/17   0740  10/06/17   1235   BUN  21*  28*  34*   CREA  0.82  0.95  1.23   CA  8.4*  8.6  8.7   ALB  3.0*  2.8*   --    K  3.6  3.7  3.8   NA  136  137  137   CL  105  108  106   CO2  22  21  23   GLU  83  58*  91        Lab Results   Component Value Date/Time    Glucose 83 10/08/2017 03:00 AM    Glucose 58 10/07/2017 07:40 AM    Glucose 91 10/06/2017 12:35 PM    Glucose 90 10/05/2017 04:42 PM    Glucose 82 10/04/2017 02:25 PM          Care coordination with Nursing/Consultants/staff: 15  Prior history, labs, and charting reviewed: 15    Procedures/Imaging:  CT abd 10/4    Total time spent with chart review, patient examination/education, discussion with staff on case,documentation and medication management / adjustment  :  30 Minutes      Dr Ryan Munroe  [phone]

## 2017-10-09 NOTE — PROGRESS NOTES
[facility]: [phone]  [facility]: [address] [place] [address] Way: [phone]    Patient Name: Xavier Lion  YOB: 1919    Date of Initial Consult: 10-9-17  Reason for Consult: Care Decisions  Requesting Provider: Alvin Galvan DO  Primary Care Physician: oJseph Nelson MD      SUMMARY:   Xavier Lion is a 80y.o. 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 Current medical issues leading to Palliative Medicine involvement include: patient likely with either bladder cancer or prostate cancer, advances age and other co-morbidities, asked to support patient/family to establish goals of care. PALLIATIVE DIAGNOSES:   1. Hematuria  2. Confusion  3. Prostate Cancer  4. Debility        PLAN:   1. Miguel Wheat LCSW, [place], [place] and I met with patient, he is [facility] but did rouse and attempted to answer questions, but he was too sleepy to participate in conversation or health care decisions. He denies pain. We met with his dtr and son who shared that patient was  10 yrs ago, he has 2 sons and 1 dtr all local and he lives with his son Riri Tompkins. He was in the [employer] during [address], but never activated his [employer], worked as a  for [employer] yard. 2. Hematuria-family aware that it is necessary to get the bleeding stopped, if the goal is to allow him to return home, as the clots lead to urinary retention. RN checked his bladder scan, had >600ml, a st cath was done, 400ml retrieved, which was burgundy in color with some small clots noted. He felt better once this was done. Spoke with -who suggested st cath using a 18G BURNETT CATH, and irrigating in and out with 60ml of NS every 6-8 hrs.  Ideally a BURNETT would be left in but he has removed already 3 indwelling catheters and this is more apt to cause ongoing bleeding/trauma. He suggested adding FLOMAX to aid in emptying his bladder, it was added. He did suggest a trial of Casodex and possibly LUPRON, since if the bleeding were due to prostate cancer invading the bladder, the antiandrogen effect would decrease the bleeding. Can discuss with both Attending and HOSPICE as would need them to agree to this treatment. 3. Confusion-patient has appeared delirious, with waxing and waning alertness and per family had never had any memory issues at home. He was fairly independent, able to do all of his ALD's and even took public buses to get around, including transferring. Explained that his advanced age was his biggest risk for delirium, that the acute illness/change in environment were factors in his present altered mental status. Explained that we have no way to insure he will get back to his normal baseline, that often all fixable things-like lab abnormalities are addressed and the patient does not immediately improve. They understand that this is why he pulled the saavedra out, they agree they want to avoid more tubes and interventions that will agitate him. 4. Prostate Cancer-it is presumed that he has a prostate cancer, as the PSA has been rising, presently is 231 that it is likely the cause of the hematuria, but without a biopsy it is not proven. Family understand and are not interested in pursuing more testing as they agree they would not want to do any surgery or chemo. 5. Debility-patient had been fairly independent but per his children his appetite had been decreasing and he lost some wt over the last year. He stopped driving and chose instead to take the bus.    6. Goals of care and Code status-patient did not complete an AMD-but both his children had spoke to him over the years, and know that he did not want to have heroics at EOL and most importantly he wanted to be independent, not to live in a nursing home or to have tubes of any kind to keep him alive. They completed a POST-electing DNR/DNI, COMFORT MEASURES and NO FEEDING TUBES. Family would like to bring him home with support of hospice. Consult placed for [address]. Will work now with attending/ to address his urinary retention, which may be due to clots and or prostate outflow obstruction. As noted have added FLOMAX, will change the st cath to allow for using a 18g Riley and to use 60ml to irrigate and then remove, to do this every 6-8 hrs. 7. Initial consult note routed to primary continuity provider  8.  Communicated plan of care with: Palliative IDT       GOALS OF CARE:     [====Goals of Care====]  GOALS OF CARE:  Patient / health care proxy stated goals: to come home with support of hospice, to be as independent as possible      TREATMENT PREFERENCES:   Code Status: DNR    Advance Care Planning:  Advance Care Planning 10/6/2017   Patient's Healthcare Decision Maker is: Legal Next of Kin   Primary Decision Maker Name Megha Kemp   Primary Decision Maker Phone Number [phone]   Primary Decision Maker Relationship to Patient Adult child   Confirm Advance Directive None   Does the patient have other document types Do Not Resuscitate;MOST/MOLST/POST/POLST       Other:    The palliative care team has discussed with patient / health care proxy about goals of care / treatment preferences for patient.  [====Goals of Care====]    Advance Care Planning 10/6/2017   Patient's Healthcare Decision Maker is: Legal Next of Kin   Primary Decision Maker Name Megha Kemp   Primary Decision Maker Phone Number [phone]   Primary Decision Maker Relationship to Patient Adult child   Confirm Advance Directive None   Does the patient have other document types Do Not Resuscitate;MOST/MOLST/POST/POLST      r [facility] [phone]     HISTORY:     History obtained from: Chart  CHIEF COMPLAINT: Blood in urine and confusion    HPI/SUBJECTIVE:  97.7, 73, 148/109, 18 on RA at 92%, Wt 144lb, Stool x 4 10-8, Voiding, PVR today >600ml  MAR-Tylenol prn,  Norvasc, Lumigan, Cosopt, HCTZ, Norco 5-325 prn-none rec'd, Zestril, Xalatan, Morphine IIV 1mg prn Q3h-none rec'd. (levaquin, Ativan IV 0.25mg prn -rec'd dose at 2pm 10-8)  LABS-WBC 9.9, H&H 14.1 & 40.5, Plat 224, INR 1.2, BUN/Creat 25/1.21->21/0.82, Albumin 3, Urine C&S NGTD.   CT A/P- The bladder is decompressed, with Riley catheter in situ. Small amount of  intravesicular gas likely secondary to catheter placement. Apparent bladder wall  thickening and perivesicular fat stranding are nonspecific, correlation for  symptoms of urinary infection may be helpful. No evidence of hydronephrosis  involving either kidney. 2. Retroperitoneal and pelvic adenopathy, indeterminate in nature. Correlation  for history of malignancy is recommended. 3. Small hiatal hernia. Diverticulosis without evidence of diverticulitis. 4. 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 5. Calcified pleural plaques likely in keeping with prior asbestos exposure.      Consult- likely bladder mass on right lateral wall and he has extensive pelvic and RP LAD measuring up to 5 cm. Could also represent PCa  VASC 3-2017-2.7 cm abdominal aortic aneurysm as measured by x-ray performed 1 months ago. He does not have symptoms of back pain, he has occasional right LQ abdominal ain radiating to his umbilicus. CT scan performed at [facility] in 2014 shows an infrarenal aorta of 2.6 cm    The patient is:   [] Verbal and participatory  [x] Non-participatory due to: Too sleepy     Clinical Pain Assessment (nonverbal scale for nonverbal patients):     Was comfortable, denies pain       FUNCTIONAL ASSESSMENT:     Palliative Performance Scale (PPS):40%          PSYCHOSOCIAL/SPIRITUAL SCREENING:     Advance Care Planning:  Advance Care Planning 10/6/2017   Patient's Healthcare Decision Maker is: Legal Next of Kin   Primary Decision Maker Name Megha Kemp   Primary Decision Maker Phone Number [phone]   Primary Decision Maker Relationship to Patient Adult child   Confirm Advance Directive None   Does the patient have other document types Do Not Resuscitate;MOST/MOLST/POST/POLST        Any spiritual / Nondenominational concerns:  [] Yes /  [x] No    Caregiver Burnout:  [] Yes /  [x] No /  [] No Caregiver Present      Anticipatory grief assessment:   [x] Normal  / [] Maladaptive       ESAS Anxiety:      ESAS Depression:          REVIEW OF SYSTEMS:     Positive and pertinent negative findings in ROS are noted above in HPI. Dtr and son provided the ROS, he has not been eating well, in hospital not any at breakfast or lunch today. Had BM x 4 yesterday. The following systems were [] reviewed / [x] unable to be reviewed as noted in HPI  Other findings are noted below. Systems: constitutional, ears/nose/mouth/throat, respiratory, gastrointestinal, genitourinary, musculoskeletal, integumentary, neurologic, psychiatric, endocrine. Positive findings noted below. Modified ESAS Completed by: provider                                   Stool Occurrence(s): 1        PHYSICAL EXAM:     Wt Readings from Last 3 Encounters:   10/06/17 65.6 kg (144 lb 10 oz)   10/05/17 75.8 kg (167 lb)   10/04/17 72.6 kg (160 lb)     Blood pressure 99/51, pulse 63, temperature 97.5 °F (36.4 °C), resp. rate 18, height 5' 2\" (1.575 m), weight 65.6 kg (144 lb 10 oz), SpO2 94 %.   Pain:  Pain Scale 1: Visual  Pain Intensity 1: 2     Pain Location 1: Abdomen        Pain Intervention(s) 1: Medication (see MAR)  Last bowel movement: 10-8    Constitutional: older man, hard of hearing, did rouse but was hard to stay awake, denies pain, no distress  Eyes: pupils equal, anicteric  Respiratory: breathing not labored, symmetric  Gastrointestinal: soft non-tender, +bowel sounds  Musculoskeletal: no deformity, no tenderness to palpation  Skin: warm, dry         HISTORY:     Active Problems:    Hematuria (10/6/2017)      Past Medical History:   Diagnosis Date    Bladder retention     Cancer ([address]     Elevated PSA     Glaucoma     Hypertension     Macular degeneration     Mumps       Past Surgical History:   Procedure Laterality Date    HX APPENDECTOMY  1940s    HX CATARACT REMOVAL  1990s      Family History   Problem Relation Age of Onset    Cancer Mother     Hypertension Daughter     Migraines Son      History reviewed, no pertinent family history.   Social History   Substance Use Topics    Smoking status: Former Smoker     Packs/day: 3.00     Years: 30.00     Types: Cigarettes     Quit date: 1/1/1969    Smokeless tobacco: Never Used    Alcohol use Yes     Allergies   Allergen Reactions    Aspirin Hives      Current Facility-Administered Medications   Medication Dose Route Frequency    lisinopril (PRINIVIL, ZESTRIL) tablet 20 mg  20 mg Oral DAILY    hydroCHLOROthiazide (MICROZIDE) capsule 12.5 mg  12.5 mg Oral DAILY    tamsulosin (FLOMAX) capsule 0.4 mg  0.4 mg Oral DAILY    bimatoprost (LUMIGAN) 0.01 % ophthalmic drops 1 Drop (Please use patient's own med) (Patient Supplied)  1 Drop Left Eye QPM    dorzolamide-timolol (COSOPT) 22.3-6.8 mg/mL ophthalmic solution 1 Drop (Please use patient's own med) (Patient Supplied)  1 Drop Left Eye BID    0.9% sodium chloride infusion  100 mL/hr IntraVENous CONTINUOUS    amLODIPine (NORVASC) tablet 2.5 mg  2.5 mg Oral DAILY    0.9% sodium chloride infusion  50 mL/hr IntraVENous CONTINUOUS    acetaminophen (TYLENOL) tablet 650 mg  650 mg Oral Q4H PRN    HYDROcodone-acetaminophen (NORCO) 5-325 mg per tablet 1 Tab  1 Tab Oral Q4H PRN    morphine injection 1 mg  1 mg IntraVENous Q3H PRN    ondansetron (ZOFRAN) injection 4 mg  4 mg IntraVENous Q4H PRN    latanoprost (XALATAN) 0.005 % ophthalmic solution 1 Drop  1 Drop Both Eyes QPM        LAB AND IMAGING FINDINGS:     Lab Results   Component Value Date/Time    WBC 9.9 10/09/2017 07:30 AM    HGB 14.1 10/09/2017 07:30 AM    PLATELET 540 17/60/3552 07:30 AM     Lab Results   Component Value Date/Time    Sodium 138 10/09/2017 07:30 AM    Potassium 3.7 10/09/2017 07:30 AM    Chloride 108 10/09/2017 07:30 AM    CO2 17 10/09/2017 07:30 AM    BUN 27 10/09/2017 07:30 AM    Creatinine 1.07 10/09/2017 07:30 AM    Calcium 8.5 10/09/2017 07:30 AM      Lab Results   Component Value Date/Time    AST (SGOT) 33 10/09/2017 07:30 AM    Alk. phosphatase 65 10/09/2017 07:30 AM    Protein, total 6.8 10/09/2017 07:30 AM    Albumin 2.9 10/09/2017 07:30 AM    Globulin 3.9 10/09/2017 07:30 AM     Lab Results   Component Value Date/Time    INR 1.2 10/06/2017 09:33 AM    Prothrombin time 14.4 10/06/2017 09:33 AM    aPTT 29.8 10/06/2017 09:33 AM      No results found for: IRON, FE, TIBC, IBCT, PSAT, FERR   No results found for: PH, PCO2, PO2  No components found for: GLPOC   No results found for: CPK, CKMB           Total time: 110  Counseling / coordination time, spent as noted above: 75  > 50% counseling / coordination?: yes with patient, son and dtr    Prolonged service was provided for  [x]30 min   []75 min in face to face time in the presence of the patient, spent as noted above. Time Start: 9:30am  Time End: 10:45am  Note: this can only be billed with 04443 (initial) or 21 [phone] (follow up). If multiple start / stop times, list each separately.

## 2017-10-09 NOTE — PROGRESS NOTES
Problem: Falls - Risk of  Goal: *Absence of Falls  Document Harjinder Fall Risk and appropriate interventions in the flowsheet.    Outcome: Progressing Towards Goal  Fall Risk Interventions:  Mobility Interventions: Bed/chair exit alarm, Communicate number of staff needed for ambulation/transfer, Patient to call before getting OOB, Utilize walker, cane, or other assitive device     Mentation Interventions: Adequate sleep, hydration, pain control, Bed/chair exit alarm, Door open when patient unattended, Evaluate medications/consider consulting pharmacy, More frequent rounding, Reorient patient, Room close to nurse's station, Toileting rounds     Medication Interventions: Assess postural VS orthostatic hypotension, Bed/chair exit alarm, Evaluate medications/consider consulting pharmacy, Teach patient to arise slowly     Elimination Interventions: Call light in reach, Patient to call for help with toileting needs, Toileting schedule/hourly rounds

## 2017-10-09 NOTE — CDMP QUERY
Please clarify if this patient is being treated/managed for:    =>acute metabolic encephalopathy in the setting of dementia related to gross hematuria and possible UTI    =>Other Explanation of clinical findings  =>Unable to Determine (no explanation of clinical findings)    The medical record reflects the following:    Risk: 79 yo male w/PMH bladder retention, prostate nodule, gross hematuria    Clinical Indicators: Per ED note  Per pt's daughter pt became much more confused and aggitated last night; pt was \"talking about things that weren't there\" and cursing and yelling at his daughter which is abnormal  Per NN 10/7  Pt confused some difficulty following commands to stay in bed. Pulling off O2 ,   0015 Pt attempting to get oob. Needs constant redirection. Treatment: Nursing assessment, Ativan    Please clarify and document your clinical opinion in the progress notes and discharge summary including the definitive and/or presumptive diagnosis, (suspected or probable), related to the above clinical findings. Please include clinical findings supporting your diagnosis. If you DECLINE this query or would like to communicate with [facility], please utilize the \"[facility] message box\" at the TOP of the Progress Note on the right.       Thank you,  Noy Burrell RN [facility]  [phone]

## 2017-10-10 NOTE — PROGRESS NOTES
[facility]  Hospitalist Division        Inpatient Daily Progress Note    Daily progress Note    Patient: Lloyd Riggins MRN: [id number]  CSN: [id number]    YOB: 1919  Age: 80 y.o. Sex: male    DOA: 10/6/2017 LOS:  LOS: 4 days                    Chief Complaint:  Bladder mass w/ pelvic LAD, urinary retention, hematuria       Subjective: 79 y/o  male with hx of HTN, COPD, CKD Stage 3, pulmonary nodules and benign inflammatory bladder tumor (2010) brought to the ED  On 10/6/2017 for continued hematuria- he was seen two days prior, given and abx and had a saavedra catheter inserted. Family stated he began pulling on the saavedra at home and the night PTA he became confused and agitated.      Pt fell earlier this morning, found ambulating in the hallway by himself- stated he was trying to find a doctor so he could go home. No complaint of headache, blurred vision or chest pain. Pt's daughter and two sons have concerns regarding pt's plan of care- states they want to know a definitive diagnosis and would like to know options for treatment that will not make his condition worse. Palliative care has been consulted and a referral to hospice is in place. Objective:      Visit Vitals    /79 (BP 1 Location: Left arm)    Pulse 85    Temp 98.1 °F (36.7 °C)    Resp 18    Ht 5' 2\" (1.575 m)    Wt 65.6 kg (144 lb 10 oz)    SpO2 91%    BMI 26.45 kg/m2     CT abdomen pelvis w/ contrast 10/4/2017  IMPRESSION:     1. The bladder is decompressed, with Saavedra catheter in situ. Small amount of  intravesicular gas likely secondary to catheter placement. Apparent bladder wall  thickening and perivesicular fat stranding are nonspecific, correlation for  symptoms of urinary infection may be helpful. No evidence of hydronephrosis  involving either kidney. 2. Retroperitoneal and pelvic adenopathy, indeterminate in nature.  Correlation  for history of malignancy is recommended. 3. Small hiatal hernia. Diverticulosis without evidence of diverticulitis. 4. 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 5. Calcified pleural plaques likely in keeping with prior asbestos exposure.       Physical Exam:  General appearance: alert, cooperative, no distress, appears stated age  Head: Normocephalic, without obvious abnormality, atraumatic  Lungs: clear to auscultation bilaterally  Heart: regular rate and rhythm, S1, S2 normal, no murmur, click, rub or gallop  Abdomen: soft, non tender, non distended . Normoactive bowel sounds. No masses, no organomegaly  Extremities: extremities normal, atraumatic, no cyanosis or edema  Skin: Skin color, texture, turgor normal. No rashes or lesions  Neurologic: Grossly normal  PSY: Mood and affect normal, appropriately behaved      Intake and Output:  Current Shift:     Last three shifts:  10/08 1901 - 10/10 0700  In: 7843.3 [P.O.:620; I.V.:7223.3]  Out: 802 [Urine:802]    No results found for this or any previous visit (from the past 24 hour(s)). Lab Results   Component Value Date/Time    Glucose 84 10/09/2017 07:30 AM    Glucose 83 10/08/2017 03:00 AM    Glucose 58 10/07/2017 07:40 AM    Glucose 91 10/06/2017 12:35 PM    Glucose 90 10/05/2017 04:42 PM        Assessment/Plan:     Patient Active Problem List   Diagnosis Code    Gross hematuria R31.0    Prostate nodule N40.2    Bladder retention R33.9    Hematuria R31.9    Altered mental status R41.82    Debility R53.81       A/P:    Hematuria- followed by urology, s/p CBI- requiring intermittent straight cath, nursing staff hematuria still present. Hgb 14.1 on 10/9/2017. Urinary retention- Avodart restarted-straight cath w/ irrigation every 6-8 hours. Bladder mass w/ pelvic LAD- now comfort care measures have been obtained after visiting with palliative care.   Family have concerns regarding definitive diagnosis and would like to know what options are available that will not worsen his condition. Acute Kidney Injury- Creatinine 1.07 on 10/9/2017- currently receiving NS @50ml/hour.   Hypertension- currently on Norvasc, Lisinopril and HCTZ  Hx of mild to moderate dementia- will obtain sitter for patient   Hx of tobacco abuse  Hx of pulmonary nodules      NII An[facility]  Pager:  [phone]  Office:  [phone]

## 2017-10-10 NOTE — PROGRESS NOTES
1920 Received shift report from Radha Mobley RN. P lying in bed with no complaints. Family concerned that they don't know what is going on and that there father doesn't; have a diagnosis. Malu Abraham verified with family that she will make sure they can talk to the MD in the AM.     2045 Pt had episode of urinary incontinence. Cleaned pt and changed blue pad.     2245 Pt appear in pain, bladder scanned (375), not enough to straight cath. Went ahead and administered Tylenol for pain. 5 Pt had episode of urinary incontinence. Cleaned pt, changed gown. Pt sitting up in recliner. 0330 Pt found in room walking around. Pt broke his IV line. Stated he wants to go home. Pt is confused. Oriented pt and placed him at the nurse's station for close observation. 0700 Pt sitting at nurse's with no complaints. Bedside and Verbal shift change report given to Radha Mobley RN (oncoming nurse) by Ashleigh Moses RN (offgoing nurse). Report included the following information SBAR, Kardex, Intake/Output, MAR and Recent Results.

## 2017-10-10 NOTE — HOSPICE
Hospice referral is appreciated. Telephone call to the patient's daughter Maddison Jiménez to discuss hospice services. Explained the hospice philosophy, IDT and services available. Maddison Jiménez stated that before this hospitalization the patient was pretty independent and wouldn't let her brother Shria Tamez \"Weston\" do much for him. His number is [phone]. Weston lives with the patient. She stated that her brother does have some questions regarding a definitive diagnosis and wants to meet with the hospitalist to discuss which is planned for today. From reading the medical record it appears that it is thought that the patient has metastatic prostate cancer. He has required frequent catheterizations  for urinary retention and has had gross hematuria. He also has a h/o COPD and CKD stage 3. Maddison Jiménez stated she is receptive of hospice for her father and would like to utilize our services. Stated that his only equipment need is a hospital bed which they plan on setting up in the living room but need some time to get the living room prepared for the hospital bed. Provided Maddison Jiménez with [facility] contact information and encouraged her to call us when the living room is ready so that the hospital bed can be ordered. 1145 Informed by Carlyn Nguyen that the patient was going to be discharged today and the daughter stated that the patient could go home to his own bed.      1400 Informed by hospice admission nurse that the patient's daughter said the patient was not going home until tomorrow after a meeting with the MD had been completed.

## 2017-10-10 NOTE — PROGRESS NOTES
Dr Isa Najjar in to see the patient and the family. The family is now saying they do not want the patient to discharge until they ca talk to Dr Carlos Rueda tomorrow. Spoke to [employer] about this patient. PCS form on Nursing unit. Patient is on will call with [facility].   Donal Funes RN

## 2017-10-10 NOTE — PROGRESS NOTES
[facility]    Progress Note      Subjective:   Patient is comfortable  CIC for bladder emptying, clots and hematuria returned with caths  Plan for home with hospice noted    Current Facility-Administered Medications   Medication Dose Route Frequency Provider Last Rate Last Dose    lisinopril (PRINIVIL, ZESTRIL) tablet 20 mg  20 mg Oral DAILY Carolee Cowing, DO   20 mg at 10/10/17 8121    hydroCHLOROthiazide (MICROZIDE) capsule 12.5 mg  12.5 mg Oral DAILY Madison Cowing, DO   12.5 mg at 10/10/17 3766    tamsulosin (FLOMAX) capsule 0.4 mg  0.4 mg Oral DAILY Artis Castano MD   0.4 mg at 10/09/17 2048    dutasteride (AVODART) capsule 0.5 mg  0.5 mg Oral DAILY Richard Orellana MD   0.5 mg at 10/10/17 0920    bimatoprost (LUMIGAN) 0.01 % ophthalmic drops 1 Drop (Please use patient's own med) (Patient Supplied)  1 Drop Left Eye QPM Jennifer Blake MD   1 Drop at 10/09/17 1826    dorzolamide-timolol (COSOPT) 22.3-6.8 mg/mL ophthalmic solution 1 Drop (Please use patient's own med) (Patient Supplied)  1 Drop Left Eye BID Jennifer Blake MD   1 Drop at 10/10/17 0919    0.9% sodium chloride infusion  100 mL/hr IntraVENous CONTINUOUS Samantha Mead  mL/hr at 10/06/17 1004 100 mL/hr at 10/06/17 1004    amLODIPine (NORVASC) tablet 2.5 mg  2.5 mg Oral DAILY Jennifer Blake MD   2.5 mg at 10/10/17 1169    0.9% sodium chloride infusion  50 mL/hr IntraVENous CONTINUOUS Jennifer Blake MD 50 mL/hr at 10/09/17 2015 50 mL/hr at 10/09/17 2015    acetaminophen (TYLENOL) tablet 650 mg  650 mg Oral Q4H PRN Jennifer Blake MD   650 mg at 10/10/17 0824    HYDROcodone-acetaminophen (NORCO) 5-325 mg per tablet 1 Tab  1 Tab Oral Q4H PRN Jennifer Blake MD   1 Tab at 10/09/17 9057    morphine injection 1 mg  1 mg IntraVENous Q3H PRN Jennifer Blake MD        ondansetron [facility]) injection 4 mg  4 mg IntraVENous Q4H PRN Jennifer Blake MD        latanoprost (XALATAN) 0.005 % ophthalmic solution 1 Drop  1 Drop Both Eyes QPM Sandie Madrid MD   1 Drop at 10/09/17 1826       Objective:     Admit weight: Weight: 167 lb 1.7 oz (75.8 kg)  Last recorded weight: Weight: 144 lb 10 oz (65.6 kg)    Temp (24hrs), Av.8 °F (36.6 °C), Min:97.5 °F (36.4 °C), Max:98.1 °F (36.7 °C)    Patient Vitals for the past 12 hrs:   Temp Pulse Resp BP SpO2   10/10/17 1401 98.1 °F (36.7 °C) 75 20 156/76 (!) 87 %   10/10/17 0823 - - - - 91 %   10/10/17 0730 98.1 °F (36.7 °C) 85 18 154/79 91 %     Drains:  none    Physical Exam:    GEN: NAD  PULM: nonlabored  ABD: Soft  : hematuria with caths    Diagnostics:     reviewed    Labs reviewed, and normal, except as noted. Micro: UCx negative    Radiology: CT 10/4/17 reviewed    Assessment:     Clarissa Stark is a 80y.o. year old male with:  Patient Active Problem List   Diagnosis Code    Gross hematuria R31.0    Prostate nodule N40.2    Bladder retention R33.9    Hematuria R31.9    Altered mental status R41.82    Debility R53.81     Likely metastatic prostate cancer, cannot r/u UCB    PLAN:     - Discussed case with Dr Jos Rutledge. This likely represents metastatic prostate cancer and most likely source of bleeding is from prostate; however there is not a definitive diagnosis. Could also have UCB. - agree with avodart to help decrease any prostatic bleeding  - discussed hospice and overall care plan. Family states they have a meeting with Dr Rita Urrutia tomorrow am at 8:30 to discuss; it is reasonable from our perspective to treat conservatively and make sure patient is comfortable  We are available as needed. Thank you for involving us in Mr. Keyes's care.  We will see him again tomorrow if he remains in house, avail if needed sooner    Ramon Kwong MD  [facility]  [facility] [place] [place]  [address] [facility]  Pager [phone]

## 2017-10-10 NOTE — PROGRESS NOTES
completed follow up visit with patient in room [phone] and found him resting peacefully with family setting in the room.   Chaplains will continue to follow and will provide pastoral care on an as needed/requested basis    Chaplain Gallito Michelle   Board Certified [address] [place] [address]   [phone]

## 2017-10-10 NOTE — PROGRESS NOTES
Patient family is requesting communication from Dr Michel Horn prior to discharge. I have spoken to the home care coordinator and she stated the patients hospice has been arranged. I have called Dr Jaspal Doll and made him aware of this and the fact the family wants to speak to Dr Michel Horn. I have spoken to the nursing unit and requested they call Dr Michel Horn and inform him that the family wishes to speak to him prior to discharge. Patient s on will call with [facility] transport.   Jonathan Lees RN

## 2017-10-10 NOTE — PROGRESS NOTES
Received bedside verbal report from [facility]. Patient is resting in recliner,NAD noted, call bell within reach, updated white board. 3907 Pt fell and Pt found on the floor in hallway closest to nurses station, pt is alert and confused, pt stating he wants to see doctor and wants to go home, reorient him, provide pillow under his head, no visible signs of injury noted, vitals taken and within normal limits, no LOC, has little abrasion under his right knee, notified Dr Lilibeth Jacobs, charge nurse Jane Buck and other nurses with him, assisted him back to bed with the help of nurses, bed at the lowest position with wheels locked, student nurse is in room with the pt, will increase more frequent rounding. 7677 Pt is resting in bed, calm and quite, NAD noted, CNA at bedside, shift assessment completed, morning med administered,will continue to monitor him.    0920 Pt is resting with no complaints, daughter is in room, notified her that pt fell this morning, daughter stating he looks better than yesterday. 26 CNA stating that pt has incontinent and she cleaned him up, will do bladder scan shortly. 1100 Patient has sitter order at this time. 1115 Patient is getting spa bath at this time, will continue to monitor him. 1300 Patient is resting in bed, sitter at bedside, will continue to monitor him. 18 Pt is OOB in chair with the assistance of sitter, NAD, pt is calm, follows command, will continue to monitor him. Bedside and Verbal shift change report given to Everett Velez (oncoming nurse) by Alejandra Salinas (offgoing nurse). Report included the following information SBAR, Kardex, ED Summary, Intake/Output and MAR.

## 2017-10-10 NOTE — PROGRESS NOTES
Post Fall Documentation      Joanna Thomason witnessed/unwitnessed fall occurred on 10/10/17 (Date) at 80 (Time). The answers to the following questions summarize the fall:     · In the patient's own words,:  What was he/she doing when he/she fell? I was walking out of room. · What are his/her complaints? I  was looking for doctor and I want to go home. · Nurse:  · Document observation, treatment, conversation, follow-up, and patient response. yes    · What was the patient's condition when found (i.e., pain, symptoms, cuts, bruises)? He was lying on his side, he was alert and stable condition, there is abrasion on his right knee    · What specific complaints did the patient have? Pain on his buttocks      · What did the staff do when patient was found (i.e., vital signs, returned to bed with fall alarm, side rails up)? Vitals taken, returned pt to bed, side rails up, doctor notified,more frequent rounding    · Which physician was notified?  Dr Mendel Fray, RN

## 2017-10-10 NOTE — PROGRESS NOTES
[facility]: [phone] ([phone])  [facility]: [address] [place] [address] Way: [phone]    Patient Name: Lilian Frances  YOB: 1919    Date of Initial Consult: 10-9-17  Reason for Consult: Care Decisions  Requesting Provider: DO Patrice  Primary Care Physician: Dinorah Posadas MD      SUMMARY:   Lilian Frances is a 80y.o. 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 Current medical issues leading to Palliative Medicine involvement include: patient likely with either bladder cancer or prostate cancer, advances age and other co-morbidities, asked to support patient/family to establish goals of care. PALLIATIVE DIAGNOSES:   1. Hematuria  2. Confusion  3. Prostate Cancer  4. Debility        PLAN:   1. I met with patient, his dtr is at bedside, he is very [place] and she provides much of his ROS, he has been pain free, in fact less urinary retention today voiding and PVR about 200 or less, eating about 20%, but taking each ENSURE. 2. Hematuria-slowly improving, was started on AVODART, got first dose today, unlikely this is reason, but with less BURNETT trauma may be improving. He denies pain and PVR has been twice today and both <200ml. 3. Confusion-continues with confusion, in fact attempting to leave, walk out, removed IV. Did not add meds for sedation, prefer use of sitter, but once home and unable to maintain night time [place], could try a SMALL DOSE OF SEROQUEL 12.5mg for persistent confusion with dangerous behaviors. Would  family about the pros/cons of antipsychotics.    4. Prostate Cancer-it is presumed that he has a prostate cancer, as the PSA has been rising, presently is 231 that it is likely the cause of the hematuria, but without a biopsy it is not proven. Family shared with me today that they are worried they may have decided prematurely to move to COMFORT CARE-\"should he get a biopsy, would any treatments be tolerated and offer him years more to live? \" The dtr is not asking these questions, says her 2 brothers are asking and she would like Dr Juliet Ryan to meet with them to get these questions answered. Spoke with Dr Juliet Ryan, who will be available tomorrow at 8:30am, shared with Dr Jackeline Hernandez, care manager and family. 5. Debility-patient had been fairly independent but per his children his appetite had been decreasing and he lost some wt over the last year. He stopped driving and chose instead to take the bus. 6. Goals of care and Code status-patient did not complete an AMD-but both his children had spoke to him over the years, and know that he did not want to have heroics at EOL and most importantly he wanted to be independent, not to live in a nursing home or to have tubes of any kind to keep him alive. They completed a POST-electing DNR/DNI, COMFORT MEASURES and NO FEEDING TUBES. Family still want to focus on his comfort, want to avoid unnecessary pain/interventions but want to review with  if there are any treatments that could extend his life without undue burden. Attempted to explain options but dtr shared that the 2 brothers wanted to meet with urology. Palliative Care will be present tomorrow at 8:30am.   7. Initial consult note routed to primary continuity provider  8.  Communicated plan of care with: Palliative IDT, Attending, , Care Manager       GOALS OF CARE:     [====Goals of Care====]  GOALS OF CARE:  Patient / health care proxy stated goals: to come home with support of hospice, to be as independent as possible      TREATMENT PREFERENCES:   Code Status: DNR    Advance Care Planning:  Advance Care Planning 10/6/2017   Patient's Healthcare Decision Maker is: Legal Next of Marj 69   Primary Decision Maker Name AdWhirl Primary Decision Maker Phone Number [phone]   Primary Decision Maker Relationship to Patient Adult child   Confirm Advance Directive None   Does the patient have other document types Do Not Resuscitate;MOST/MOLST/POST/POLST       Other:    The palliative care team has discussed with patient / health care proxy about goals of care / treatment preferences for patient.  [====Goals of Care====]    Advance Care Planning 10/6/2017   Patient's Healthcare Decision Maker is: Legal Next of Kin   Primary Decision Maker Name Megha Kemp   Primary Decision Maker Phone Number [phone]   Primary Decision Maker Relationship to Patient Adult child   Confirm Advance Directive None   Does the patient have other document types Do Not Resuscitate;MOST/MOLST/POST/POLST      dtr Mauro Thapa [phone]     HISTORY:     History obtained from: Chart  CHIEF COMPLAINT: Blood in urine and confusion    HPI/SUBJECTIVE:  98.1, 85, 154/79, 18 on RA at 91%, Wt 144lb, Stool x 4 10-8, Voiding, PVR today >600ml  MAR-Tylenol prn,  Norvasc, Lumigan, Cosopt, HCTZ, Norco 5-325 prn-none rec'd, Zestril, Xalatan, Morphine IIV 1mg prn Q3h-none rec'd. (levaquin, Ativan IV 0.25mg prn -rec'd dose at 2pm 10-8) AVODART, FLOMAX  LABS-WBC 9.9, H&H 14.1 & 40.5, Plat 224, INR 1.2, BUN/Creat 25/1.21->21/0.82, Albumin 3, Urine C&S NGTD.   CT A/P- The bladder is decompressed, with Riley catheter in situ. Small amount of  intravesicular gas likely secondary to catheter placement. Apparent bladder wall  thickening and perivesicular fat stranding are nonspecific, correlation for  symptoms of urinary infection may be helpful. No evidence of hydronephrosis  involving either kidney. 2. Retroperitoneal and pelvic adenopathy, indeterminate in nature. Correlation  for history of malignancy is recommended. 3. Small hiatal hernia. Diverticulosis without evidence of diverticulitis.   4. 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 5. Calcified pleural plaques likely in keeping with prior asbestos exposure.      Consult- likely bladder mass on right lateral wall and he has extensive pelvic and RP LAD measuring up to 5 cm. Could also represent PCa  VASC 3-2017-2.7 cm abdominal aortic aneurysm as measured by x-ray performed 1 months ago. He does not have symptoms of back pain, he has occasional right LQ abdominal ain radiating to his umbilicus. CT scan performed at [facility] in 2014 shows an infrarenal aorta of 2.6 cm    The patient is:   [] Verbal and participatory  [x] Non-participatory due to: Too sleepy     Clinical Pain Assessment (nonverbal scale for nonverbal patients): Was comfortable, denies pain       FUNCTIONAL ASSESSMENT:     Palliative Performance Scale (PPS):40%          PSYCHOSOCIAL/SPIRITUAL SCREENING:     Advance Care Planning:  Advance Care Planning 10/6/2017   Patient's Healthcare Decision Maker is: Legal Next of Marj 69   Primary Decision Maker Name Megha Kemp   Primary Decision Maker Phone Number [phone]   Primary Decision Maker Relationship to Patient Adult child   Confirm Advance Directive None   Does the patient have other document types Do Not Resuscitate;MOST/MOLST/POST/POLST        Any spiritual / Faith concerns:  [] Yes /  [x] No    Caregiver Burnout:  [] Yes /  [x] No /  [] No Caregiver Present      Anticipatory grief assessment:   [x] Normal  / [] Maladaptive       ESAS Anxiety:      ESAS Depression:          REVIEW OF SYSTEMS:     Positive and pertinent negative findings in ROS are noted above in HPI. Dtr and son provided the ROS, ate 20% today, drinking the ensure, up OOB but not steady on feet, had BM day prior. No pain, passing urine, PVR about 200ml. .  The following systems were [] reviewed / [x] unable to be reviewed as noted in HPI  Other findings are noted below.   Systems: constitutional, ears/nose/mouth/throat, respiratory, gastrointestinal, genitourinary, musculoskeletal, integumentary, neurologic, psychiatric, endocrine. Positive findings noted below. Modified ESAS Completed by: provider                                   Stool Occurrence(s): 1        PHYSICAL EXAM:     Wt Readings from Last 3 Encounters:   10/06/17 65.6 kg (144 lb 10 oz)   10/05/17 75.8 kg (167 lb)   10/04/17 72.6 kg (160 lb)     Blood pressure 154/79, pulse 85, temperature 98.1 °F (36.7 °C), resp. rate 18, height 5' 2\" (1.575 m), weight 65.6 kg (144 lb 10 oz), SpO2 91 %. Pain:  Pain Scale 1: Visual  Pain Intensity 1: 0     Pain Location 1: Abdomen        Pain Intervention(s) 1: Medication (see MAR)  Last bowel movement: 10-8    Constitutional: older man, hard of hearing, although alert not talking  Eyes: pupils equal, anicteric  Respiratory: breathing not labored, symmetric  Gastrointestinal: soft non-tender, +bowel sounds  Musculoskeletal: no deformity, no tenderness to palpation  Skin: warm, dry         HISTORY:     Active Problems:    Hematuria (10/6/2017)      Altered mental status (10/9/2017)      Debility (10/9/2017)      Past Medical History:   Diagnosis Date    Bladder retention     Cancer ([facility] [address]     Elevated PSA     Glaucoma     Hypertension     Macular degeneration     Mumps       Past Surgical History:   Procedure Laterality Date    HX APPENDECTOMY  1940s    HX CATARACT REMOVAL  1990s      Family History   Problem Relation Age of Onset    Cancer Mother     Hypertension Daughter     Migraines Son      History reviewed, no pertinent family history.   Social History   Substance Use Topics    Smoking status: Former Smoker     Packs/day: 3.00     Years: 30.00     Types: Cigarettes     Quit date: 1/1/1969    Smokeless tobacco: Never Used    Alcohol use Yes     Allergies   Allergen Reactions    Aspirin Hives      Current Facility-Administered Medications   Medication Dose Route Frequency    lisinopril (PRINIVIL, ZESTRIL) tablet 20 mg  20 mg Oral DAILY    hydroCHLOROthiazide (MICROZIDE) capsule 12.5 mg  12.5 mg Oral DAILY    tamsulosin (FLOMAX) capsule 0.4 mg  0.4 mg Oral DAILY    dutasteride (AVODART) capsule 0.5 mg  0.5 mg Oral DAILY    bimatoprost (LUMIGAN) 0.01 % ophthalmic drops 1 Drop (Please use patient's own med) (Patient Supplied)  1 Drop Left Eye QPM    dorzolamide-timolol (COSOPT) 22.3-6.8 mg/mL ophthalmic solution 1 Drop (Please use patient's own med) (Patient Supplied)  1 Drop Left Eye BID    0.9% sodium chloride infusion  100 mL/hr IntraVENous CONTINUOUS    amLODIPine (NORVASC) tablet 2.5 mg  2.5 mg Oral DAILY    0.9% sodium chloride infusion  50 mL/hr IntraVENous CONTINUOUS    acetaminophen (TYLENOL) tablet 650 mg  650 mg Oral Q4H PRN    HYDROcodone-acetaminophen (NORCO) 5-325 mg per tablet 1 Tab  1 Tab Oral Q4H PRN    morphine injection 1 mg  1 mg IntraVENous Q3H PRN    ondansetron (ZOFRAN) injection 4 mg  4 mg IntraVENous Q4H PRN    latanoprost (XALATAN) 0.005 % ophthalmic solution 1 Drop  1 Drop Both Eyes QPM        LAB AND IMAGING FINDINGS:     Lab Results   Component Value Date/Time    WBC 9.9 10/09/2017 07:30 AM    HGB 14.1 10/09/2017 07:30 AM    PLATELET 008 59/83/9597 07:30 AM     Lab Results   Component Value Date/Time    Sodium 138 10/09/2017 07:30 AM    Potassium 3.7 10/09/2017 07:30 AM    Chloride 108 10/09/2017 07:30 AM    CO2 17 10/09/2017 07:30 AM    BUN 27 10/09/2017 07:30 AM    Creatinine 1.07 10/09/2017 07:30 AM    Calcium 8.5 10/09/2017 07:30 AM      Lab Results   Component Value Date/Time    AST (SGOT) 33 10/09/2017 07:30 AM    Alk.  phosphatase 65 10/09/2017 07:30 AM    Protein, total 6.8 10/09/2017 07:30 AM    Albumin 2.9 10/09/2017 07:30 AM    Globulin 3.9 10/09/2017 07:30 AM     Lab Results   Component Value Date/Time    INR 1.2 10/06/2017 09:33 AM    Prothrombin time 14.4 10/06/2017 09:33 AM    aPTT 29.8 10/06/2017 09:33 AM      No results found for: IRON, FE, TIBC, IBCT, PSAT, FERR   No results found for: PH, PCO2, PO2  No components found for: GLPOC   No results found for: CPK, CKMB           Total time: 40  Counseling / coordination time, spent as noted above: 25  > 50% counseling / coordination?: yes with patient,  dtr and     Prolonged service was provided for  []30 min   []75 min in face to face time in the presence of the patient, spent as noted above. Time Start:  Time End:   Note: this can only be billed with 78656 (initial) or 29241 (follow up). If multiple start / stop times, list each separately.

## 2017-10-10 NOTE — PROGRESS NOTES
Care Management Interventions  PCP Verified by CM:  Yes  Palliative Care Criteria Met (RRAT>21 & CHF Dx)?: No  Transition of Care Consult (CM Consult): Ernie: Yes  Discharge Durable Medical Equipment: No  Physical Therapy Consult: No  Occupational Therapy Consult: No  Speech Therapy Consult: No  Current Support Network: [address] [place] [address] discussed with Pt/Family/Caregiver: Yes  Freedom of Choice Offered: Yes  Discharge Location  Discharge Placement: Home with hospice

## 2017-10-10 NOTE — PROGRESS NOTES
Problem: Falls - Risk of  Goal: *Absence of Falls  Document Harjinder Fall Risk and appropriate interventions in the flowsheet.    Outcome: Progressing Towards Goal  Fall Risk Interventions:  Mobility Interventions: Patient to call before getting OOB, Communicate number of staff needed for ambulation/transfer     Mentation Interventions: Family/sitter at bedside, Reorient patient, Door open when patient unattended     Medication Interventions: Patient to call before getting OOB, Evaluate medications/consider consulting pharmacy     Elimination Interventions: Call light in reach, Patient to call for help with toileting needs

## 2017-10-10 NOTE — PROGRESS NOTES
Problem: Falls - Risk of  Goal: *Absence of Falls  Document Harjinder Fall Risk and appropriate interventions in the flowsheet.    Outcome: Progressing Towards Goal  Fall Risk Interventions:  Mobility Interventions: Communicate number of staff needed for ambulation/transfer, Patient to call before getting OOB, Strengthening exercises (ROM-active/passive), Utilize walker, cane, or other assitive device     Mentation Interventions: Adequate sleep, hydration, pain control, Door open when patient unattended, Evaluate medications/consider consulting pharmacy, More frequent rounding, Reorient patient, Room close to nurse's station, Toileting rounds     Medication Interventions: Assess postural VS orthostatic hypotension, Evaluate medications/consider consulting pharmacy, Teach patient to arise slowly, Patient to call before getting OOB     Elimination Interventions: Call light in reach, Patient to call for help with toileting needs, Toileting schedule/hourly rounds     History of Falls Interventions: Consult care management for discharge planning, Door open when patient unattended, Evaluate medications/consider consulting pharmacy, Investigate reason for fall, Room close to nurse's station

## 2017-10-10 NOTE — PROGRESS NOTES
Bedside shift change report given to RN Lia Dunaway (oncoming nurse) by Khari Roy (offgoing nurse). Report included the following information SBAR.

## 2017-10-10 NOTE — PROGRESS NOTES
1925 Received shift report from Yenny Cameron RN. Pt lying in bed with no complaints. Sitter and family at bedside. Pt confused and only oriented to self. Call light in reach of sitter. Will continue to monitor. 2000 Discussed with sitter the plan for the evening. Sitter will provide pt a bath closer to bedtime and I'll administer Tylenol at that time to help the pt sleep. 2045 Pt states he is in pain, unable to use numeric pain scale. Is able to point to where the pain is, but unable to describe the pain. Administered pain med. 0330 Pt pulled out Peripheral IV, EJ being utilized now. 0515 Pt appears to be uncomfortable, pt does not say he is in pain or not, administered Tylenol. Pt got out of chair a pushed staff. Called in more RNs to help get pt back in chair. Pt is confused, only oriented to self.     0600 Pt placed at nursing station for safety reasons. Bedside and Verbal shift change report given to Laine Diehl RN (oncoming nurse) by Mini Foster RN (offgoing nurse). Report included the following information SBAR, Kardex, Intake/Output, MAR and Recent Results.

## 2017-10-10 NOTE — PROGRESS NOTES
Spoke with the pt's dgt, Teo Lei about hospital bed delivery. She reported the pt can go home to his own bed. LVMM for hospice nurse, [employer], about bed order. DC pending.

## 2017-10-11 NOTE — PROGRESS NOTES
Received bedside shift report from [facility]. Patient is resting in bed, NAD noted, sitter at bedside, pt is confused and only oriented to self, call light within reach of sitter, will continue to monitor him. 0740 Pt complaining of pain in his lower abdomen, night nurse stated that pt has been incontinent and voiding, however I did bladder scan, showed  502 ml of urine. 0750 Right before inserting straight catheter pt has passed urine about 100 ml, Straight catheter is done, flushed catheter with 60 ml of NS, drained 550 ml of bloody urine, very small amount of clot noted. 1000 Pt is resting in bed, sitter at bedside, will continue to monitor him. 1200 Patient is agitated and trying to get OOB, sitter at bedside, bed at the lowest position, will continue to monitor him. 1400 Pt is agitated, attempting to get OOB, sitter at bedside, daughter in room, will continue to monitor him. 1500 Dr Ana Leung called this nurse and stated that he doesn't think that pt needs any CT scan and he checked his previous CT report as well, he is going to speak to Dr Kt Castro about that as well.    1822 Lidoderm patch is not in [facility]. 1845 Pt has incontinent episode, getting cleaned up by CNA, resting quietly in bed, daughter in room, will continue to monitor him. Bedside and Verbal shift change report given to Celestino Bauer (oncoming nurse) by Nora Antunez (offgoing nurse). Report included the following information SBAR, Kardex, OR Summary, Intake/Output and MAR.

## 2017-10-11 NOTE — PROGRESS NOTES
Problem: Falls - Risk of  Goal: *Absence of Falls  Document Harjinder Fall Risk and appropriate interventions in the flowsheet. Outcome: Progressing Towards Goal  Fall Risk Interventions:  Mobility Interventions: Patient to call before getting OOB     Mentation Interventions: Family/sitter at bedside, Reorient patient     Medication Interventions: Teach patient to arise slowly     Elimination Interventions:  Toileting schedule/hourly rounds, Urinal in reach     History of Falls Interventions: Door open when patient unattended, Room close to nurse's station, Investigate reason for fall

## 2017-10-11 NOTE — PROGRESS NOTES
[facility]  Hospitalist Division        Inpatient Daily Progress Note    Daily progress Note    Patient: Bert Doherty MRN: [id number]  CSN: [id number]    YOB: 1919  Age: 80 y.o. Sex: male    DOA: 10/6/2017 LOS:  LOS: 5 days                    Chief Complaint: Bladder mass with pelvic LAD, urinary retention and hemturia      Subjective: 81 y/o  male with hx of HTN, COPD, CKD Stage 3, pulmonary nodules and benign inflammatory bladder tumor (2010) brought to the ED  On 10/6/2017 for continued hematuria- he was seen two days prior, given and abx and had a saavedra catheter inserted. Family stated he began pulling on the saavedra at home and the night PTA he became confused and agitated.      Pt fell yesterday morning, found ambulating in brown by himself. He's had a sitter since yesterday. However, staff still reports pt pulling out IV's, combative with staff at times. Nurse states she did bladder scan this morning with 500ml noted, she did a straight cath and received 550ml of urine- small amount of hematuria- no clots. Daughter at bedside. Objective:      Visit Vitals    BP 94/56 (BP 1 Location: Left arm, BP Patient Position: At rest)    Pulse 61    Temp 97.3 °F (36.3 °C)    Resp 16    Ht 5' 2\" (1.575 m)    Wt 65.6 kg (144 lb 10 oz)    SpO2 95%    BMI 26.45 kg/m2         Physical Exam:  General appearance: alert, cooperative, no distress, appears stated age  Head: Normocephalic, without obvious abnormality, atraumatic  Lungs: clear to auscultation bilaterally  Heart: regular rate and rhythm, S1, S2 normal, no murmur, click, rub or gallop  Abdomen: soft, non tender, non distended . Normoactive bowel sounds.  No masses, no organomegaly  Extremities: extremities normal, atraumatic, no cyanosis or edema  Skin: Skin color, texture, turgor normal. No rashes or lesions  Neurologic: Grossly normal  PSY: calm at the moment         Intake and Output:  Current Shift:  10/11 0701 - 10/11 1900  In: 100 [P.O.:100]  Out: 650 [Urine:650]  Last three shifts:  10/09 1901 - 10/11 0700  In: 2276.7 [P.O.:1100; I.V.:1176.7]  Out: 206 [Urine:206]    No results found for this or any previous visit (from the past 24 hour(s)). Lab Results   Component Value Date/Time    Glucose 84 10/09/2017 07:30 AM    Glucose 83 10/08/2017 03:00 AM    Glucose 58 10/07/2017 07:40 AM    Glucose 91 10/06/2017 12:35 PM    Glucose 90 10/05/2017 04:42 PM        Assessment/Plan:     Patient Active Problem List   Diagnosis Code    Gross hematuria R31.0    Prostate nodule N40.2    Bladder retention R33.9    Hematuria R31.9    Altered mental status R41.82    Debility R53.81       A/P:  Hematuria- followed by urology, s/p CBI- requiring intermittent straight cath, small amount of hematuria. Hgb 14.1 on 10/9/2017. Urinary retention- Avodart restarted-straight cath w/ irrigation every 6-8 hours. Bladder mass w/ pelvic LAD- now comfort care measures have been obtained after visiting with palliative care. Family have concerns regarding definitive diagnosis and would like to know what options are available that will not worsen his condition. Dr. Ira Casillasates aware. Bone scan ordered- will be obtained tomorrow. Acute Kidney Injury- Creatinine 1.07 on 10/9/2017- currently receiving NS @50ml/hour. Hypertension- currently on Norvasc, Lisinopril and HCTZ  Hx of mild to moderate dementia- pt combative with staff at times, pulled out IV. He did receive one time dose of Ativan IV. Sitter with pt.     Hx of tobacco abuse  Hx of pulmonary nodules      NII Andujar-[facility]  Pager:  [phone]  Office:  [phone]

## 2017-10-11 NOTE — PROGRESS NOTES
[facility]: [phone] [phone]  [facility]: [address] [place] [address] Way: [phone]    Patient Name: Kandace Leos  YOB: 1919    Date of Initial Consult: 10-9-17  Reason for Consult: Care Decisions  Requesting Provider: Olivier Vazquez DO  Primary Care Physician: Allan Smith MD      SUMMARY:   Kandace Leos is a 80y.o. 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 Current medical issues leading to Palliative Medicine involvement include: patient likely with either bladder cancer or prostate cancer, advances age and other co-morbidities, asked to support patient/family to establish goals of care. PALLIATIVE DIAGNOSES:   1. Hematuria  2. Confusion  3. Prostate Cancer  4. Debility        PLAN:   1. Dr Artur Munoz and I met with patient but b/c he is unable to participate in his care we met with his dtr, son, Mary Carmen Briggs and his dtr in law. He is eating a bit better, but remains confused, especially after dark. 2. Hematuria-slowly improving, was started on AVODART, dose yesterday, and yesterday his PVR was less, this am it was over 500ml and he required a ST CATH and RN noted it to be still bloody with small clots, she did irrigate it as well. Discussed that this is likely to continue for some time, that family will need to learn to do the ST CATH, can be supervised/taught by HOME HOSPICE. Family clear on the fact that the urinary retention can be due in part to the blood clots, that use of FLOMAX and AVODART may help to shrink prostate and slow bleeding(avodart) if this is a PROSTATE CANCER. 3. Confusion-family voiced understanding that his confusion waxes and wanes, worse they noted when it gets dark and that he has required a sitter.  They are concerned that at home he will continue to be agitated and we discussed a TRIAL of SEROQUEL 12.5mg at HS, which if helpful can be continued at home-order placed. We discussed the pros and cons of all agents used for confusion and behaviors that are dangerous-such as TRAZODONE, ATIVAN, SEROQUEL. Shared many techniques for improving safety at home. 4. Prostate Cancer-Dr Lizarraga reviewed the CT SCAN with family -that the lymphadenopathy represents evidence that a cancer is present and that is late stage. That given his hard prostate by exam and elevated PSA it is probably a PROSTATE CANCER but he shared that without tissue it is not confirmed. Family voiced desire to NOT PUT HIM THROUGH painful testing that will really not change his quality nor length of his life. His dtr mentioned that he was complaining of back pain, low down, not sure if new or due to simply being in bed more than usual, but Dr Carlos Rueda shared that prostate cancer can spread to the bone, it would be helpful if a scan did show evidence of bone mets and then he might actually benefit from addition of ELIGARD. That it could also work to stop bleeding and may also improve his quality of life. Plan then is to get imaging-either CT of LS or BONE SCAN to determine the cause of his back pain. 5. Debility-patient had been fairly independent but per his children his appetite had been decreasing and he lost some wt over the last year. He stopped driving and chose instead to take the bus. 6. Goals of care and Code status-patient did not complete an AMD-but both his children had spoke to him over the years, and know that he did not want to have heroics at EOL and most importantly he wanted to be independent, not to live in a nursing home or to have tubes of any kind to keep him alive. They completed a POST-electing DNR/DNI, COMFORT MEASURES and NO FEEDING TUBES.  After candid conversation with Dr Carlos Rueda the family would like to do noninvasive testing-ie scan of his back, if there are bone mets c/w Prostate Ca they would like him to receive ELIGARD to help stop the bladder bleeding and lessen back pain. They are ready to go home with support of hospice, but will need to learn how to [place] him as urinary retention is ongoing. For his delirium will try SEROQUEL at HS, if he does have localized back pain, a trial of LIDODERM can be offered. 7. Initial consult note routed to primary continuity provider  8.  Communicated plan of care with: Palliative IDT, Attending, , Care Manager       GOALS OF CARE:     [====Goals of Care====]  GOALS OF CARE:  Patient / health care proxy stated goals: to come home with support of hospice, to be as independent as possible      TREATMENT PREFERENCES:   Code Status: DNR    Advance Care Planning:  Advance Care Planning 10/6/2017   Patient's Healthcare Decision Maker is: Legal Next of Kin   Primary Decision Maker Name Megha Kemp   Primary Decision Maker Phone Number [phone]   Primary Decision Maker Relationship to Patient Adult child   Confirm Advance Directive None   Does the patient have other document types Do Not Resuscitate;MOST/MOLST/POST/POLST       Other:    The palliative care team has discussed with patient / health care proxy about goals of care / treatment preferences for patient.  [====Goals of Care====]    Advance Care Planning 10/6/2017   Patient's Healthcare Decision Maker is: Legal Next of Kin   Primary Decision Maker Name Megha Kemp   Primary Decision Maker Phone Number [phone]   Primary Decision Maker Relationship to Patient Adult child   Confirm Advance Directive None   Does the patient have other document types Do Not Resuscitate;MOST/MOLST/POST/POLST      dtr Uriel [facility] [phone]     HISTORY:     History obtained from: Chart  CHIEF COMPLAINT: Blood in urine and confusion    HPI/SUBJECTIVE:  97.2, 68, 168/67, 20 on RA at 95%, Wt 144lb, Stool x 4 10-8, Voiding, PVR yesterday <200ml today >500ml  MAR-Tylenol prn,  Norvasc, Lumigan, Cosopt, HCTZ, Norco 5-325 prn-none rec'd, Zestril, Xalatan, Morphine IIV 1mg prn Q3h-none rec'd. (levaquin, Ativan IV 0.25mg prn -rec'd dose at 2pm 10-8) AVODART, FLOMAX  LABS-WBC 9.9, H&H 14.1 & 40.5, Plat 224, INR 1.2, BUN/Creat 25/1.21->21/0.82, Albumin 3, Urine C&S NGTD.   CT A/P- The bladder is decompressed, with Riley catheter in situ. Small amount of  intravesicular gas likely secondary to catheter placement. Apparent bladder wall  thickening and perivesicular fat stranding are nonspecific, correlation for  symptoms of urinary infection may be helpful. No evidence of hydronephrosis  involving either kidney. 2. Retroperitoneal and pelvic adenopathy, indeterminate in nature. Correlation  for history of malignancy is recommended. 3. Small hiatal hernia. Diverticulosis without evidence of diverticulitis. 4. 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 5. Calcified pleural plaques likely in keeping with prior asbestos exposure.      Consult- likely bladder mass on right lateral wall and he has extensive pelvic and RP LAD measuring up to 5 cm. Could also represent PCa  [facility] 3-2017-2.7 cm abdominal aortic aneurysm as measured by x-ray performed 1 months ago. He does not have symptoms of back pain, he has occasional right LQ abdominal ain radiating to his umbilicus. CT scan performed at [facility] in 2014 shows an infrarenal aorta of 2.6 cm    The patient is:   [] Verbal and participatory  [x] Non-participatory due to: Too sleepy     Clinical Pain Assessment (nonverbal scale for nonverbal patients):     Was comfortable, denies pain       FUNCTIONAL ASSESSMENT:     Palliative Performance Scale (PPS):40%          PSYCHOSOCIAL/SPIRITUAL SCREENING:     Advance Care Planning:  Advance Care Planning 10/6/2017   Patient's Healthcare Decision Maker is: Legal Next of Kin   Primary Decision Maker Name Megha Kemp   Primary Decision Maker Phone Number [phone]   Primary Decision Maker Relationship to Patient Adult child   Confirm Advance Directive None   Does the patient have other document types Do Not Resuscitate;MOST/MOLST/POST/POLST        Any spiritual / Buddhism concerns:  [] Yes /  [x] No    Caregiver Burnout:  [] Yes /  [x] No /  [] No Caregiver Present      Anticipatory grief assessment:   [x] Normal  / [] Maladaptive       ESAS Anxiety:      ESAS Depression:          REVIEW OF SYSTEMS:     Positive and pertinent negative findings in ROS are noted above in HPI. Dtr provided the ROS, ate 50% today, drinking the ensure, up OOB but not steady on feet, had BM day prior. No pain, passing urine, PVR about 500ml. Quite confused and at time agitated and irritable, even to his dtr. The following systems were [] reviewed / [x] unable to be reviewed as noted in HPI  Other findings are noted below. Systems: constitutional, ears/nose/mouth/throat, respiratory, gastrointestinal, genitourinary, musculoskeletal, integumentary, neurologic, psychiatric, endocrine. Positive findings noted below. Modified ESAS Completed by: provider                                   Stool Occurrence(s): 1        PHYSICAL EXAM:     Wt Readings from Last 3 Encounters:   10/06/17 65.6 kg (144 lb 10 oz)   10/05/17 75.8 kg (167 lb)   10/04/17 72.6 kg (160 lb)     Blood pressure 168/67, pulse 68, temperature 97.2 °F (36.2 °C), resp. rate 20, height 5' 2\" (1.575 m), weight 65.6 kg (144 lb 10 oz), SpO2 95 %.   Pain:  Pain Scale 1: Numeric (0 - 10)  Pain Intensity 1: 10 (rt. side pain lower )     Pain Location 1: Abdomen, Groin        Pain Intervention(s) 1: Medication (see MAR)  Last bowel movement: 10-8    Constitutional: older man, hard of hearing, although alert not talking  Eyes: pupils equal, anicteric  Respiratory: breathing not labored, symmetric  Gastrointestinal: soft non-tender, +bowel sounds  Musculoskeletal: no deformity, no tenderness to palpation  Skin: warm, dry         HISTORY:     Principal Problem:    Hematuria (10/6/2017)    Active Problems:    Altered mental status (10/9/2017)      Debility (10/9/2017)      Past Medical History:   Diagnosis Date    Bladder retention     Cancer ([facility] [address]     Elevated PSA     Glaucoma     Hypertension     Macular degeneration     Mumps       Past Surgical History:   Procedure Laterality Date    HX APPENDECTOMY  1940s    HX CATARACT REMOVAL  1990s      Family History   Problem Relation Age of Onset    Cancer Mother     Hypertension Daughter     Migraines Son      History reviewed, no pertinent family history.   Social History   Substance Use Topics    Smoking status: Former Smoker     Packs/day: 3.00     Years: 30.00     Types: Cigarettes     Quit date: 1/1/1969    Smokeless tobacco: Never Used    Alcohol use Yes     Allergies   Allergen Reactions    Aspirin Hives      Current Facility-Administered Medications   Medication Dose Route Frequency    lisinopril (PRINIVIL, ZESTRIL) tablet 20 mg  20 mg Oral DAILY    hydroCHLOROthiazide (MICROZIDE) capsule 12.5 mg  12.5 mg Oral DAILY    tamsulosin (FLOMAX) capsule 0.4 mg  0.4 mg Oral DAILY    dutasteride (AVODART) capsule 0.5 mg  0.5 mg Oral DAILY    bimatoprost (LUMIGAN) 0.01 % ophthalmic drops 1 Drop (Please use patient's own med) (Patient Supplied)  1 Drop Left Eye QPM    dorzolamide-timolol (COSOPT) 22.3-6.8 mg/mL ophthalmic solution 1 Drop (Please use patient's own med) (Patient Supplied)  1 Drop Left Eye BID    0.9% sodium chloride infusion  100 mL/hr IntraVENous CONTINUOUS    amLODIPine (NORVASC) tablet 2.5 mg  2.5 mg Oral DAILY    0.9% sodium chloride infusion  50 mL/hr IntraVENous CONTINUOUS    acetaminophen (TYLENOL) tablet 650 mg  650 mg Oral Q4H PRN    HYDROcodone-acetaminophen (NORCO) 5-325 mg per tablet 1 Tab  1 Tab Oral Q4H PRN    morphine injection 1 mg  1 mg IntraVENous Q3H PRN    ondansetron (ZOFRAN) injection 4 mg  4 mg IntraVENous Q4H PRN        LAB AND IMAGING FINDINGS:     Lab Results   Component Value Date/Time    WBC 9.9 10/09/2017 07:30 AM    HGB 14.1 10/09/2017 07:30 AM    PLATELET 692 25/32/5303 07:30 AM     Lab Results   Component Value Date/Time    Sodium 138 10/09/2017 07:30 AM    Potassium 3.7 10/09/2017 07:30 AM    Chloride 108 10/09/2017 07:30 AM    CO2 17 10/09/2017 07:30 AM    BUN 27 10/09/2017 07:30 AM    Creatinine 1.07 10/09/2017 07:30 AM    Calcium 8.5 10/09/2017 07:30 AM      Lab Results   Component Value Date/Time    AST (SGOT) 33 10/09/2017 07:30 AM    Alk. phosphatase 65 10/09/2017 07:30 AM    Protein, total 6.8 10/09/2017 07:30 AM    Albumin 2.9 10/09/2017 07:30 AM    Globulin 3.9 10/09/2017 07:30 AM     Lab Results   Component Value Date/Time    INR 1.2 10/06/2017 09:33 AM    Prothrombin time 14.4 10/06/2017 09:33 AM    aPTT 29.8 10/06/2017 09:33 AM      No results found for: IRON, FE, TIBC, IBCT, PSAT, FERR   No results found for: PH, PCO2, PO2  No components found for: GLPOC   No results found for: CPK, CKMB           Total time: 110  Counseling / coordination time, spent as noted above: 70  > 50% counseling / coordination?: yes with patient,  dtr , son and dtr in law and Dr Jerry Pallas    Prolonged service was provided for  []30 min   [x]75 min in face to face time in the presence of the patient, spent as noted above. Time Start:8:30am  Time End: 9:40am  Note: this can only be billed with 45609 (initial) or 21 [phone] (follow up). If multiple start / stop times, list each separately.

## 2017-10-11 NOTE — PROGRESS NOTES
I spoke to Dr Shruthi Robb regarding this patients meeting with herself and Dr Ken Lara. Discharge. Dr Shruthi Robb stated Dr Ken Lara stated he will order a CT of the patients back to look for malignancy. The family is requesting and additional night to wait those results. Dr Shruthi Robb explained that if the results are positive he could receive a medication which will eliminate testosterone. I have spoken to Dr Gemma Orozco and he explained that the medication the patient will need if the CT is positive is a chemotherapeutic drug and the patient will need to begin that in the hospital. Dr Gemma Orozco explained that the patient may not be ready for discharge until tomorrow.  Soumya Ang RN

## 2017-10-11 NOTE — PROGRESS NOTES
Family to meet with Dr Ira Vasquez today and discuss patients medical prognosis. Discharge disposition is dependent upon the decisions made following this . Case management following.  Lexy Sims RN

## 2017-10-12 NOTE — ANCILLARY DISCHARGE INSTRUCTIONS
Patient and/or next of kin has been given the [facility] Important Message From Medicare About Your Rights\" letter and all questions were answered. Paper copy signed.

## 2017-10-12 NOTE — PROGRESS NOTES
Progress Note    Patient: Omar Donato MRN: [id number]  SSN: [id number]    YOB: 1919  Age: 80 y.o. Sex: male      Admit Date: 10/6/2017    LOS: 6 days     Subjective:     Caregiver at bedside gave information as patient was non-verbal. No evident distress. Objective:     Vitals:    10/11/17 1423 10/11/17 1929 10/12/17 0145 10/12/17 0517   BP: 139/65 112/56 158/73 130/72   Pulse: (!) 57 (!) 56 66 (!) 56   Resp: 16 16 16 15   Temp: 97.4 °F (36.3 °C) 97.7 °F (36.5 °C) 97.6 °F (36.4 °C) 98.1 °F (36.7 °C)   SpO2: 95% 92% 96% 95%   Weight:       Height:            Intake and Output:  Current Shift: 10/12 0701 - 10/12 1900  In: 620 [P.O.:120; I.V.:500]  Out: 1 [Urine:1]  Last three shifts: 10/10 1901 - 10/12 0700  In: 868.3 [P.O.:280; I.V.:588.3]  Out: 3466 [Urine:1154]    Physical Exam:   GENERAL: alert, cooperative, no distress, appears stated age  ABDOMEN: soft, non-tender.  Bowel sounds normal. No masses,  no organomegaly  EXTREMITIES:  extremities normal, atraumatic, no cyanosis or edema    Lab/Data Review:  BMP: No results found for: NA, K, CL, CO2, AGAP, GLU, BUN, CREA, GFRAA, GFRNA  CBC: No results found for: WBC, HGB, HGBEXT, HCT, HCTEXT, PLT, PLTEXT, HGBEXT, HCTEXT, PLTEXT     Assessment:      Gross hematuria R31.0    Prostate nodule N40.2    Bladder retention R33.9    Hematuria R31.9    Altered mental status R41.82    Debility R53.81      Likely metastatic prostate cancer, cannot r/u UCB      Plan:     D/C to hospice    Signed By: Twyla Mills MD     October 12, 2017

## 2017-10-12 NOTE — PROGRESS NOTES
2031: Received patient in bed awake,alert  Oriented  X1. No signs of distress. Sitter at the bedside. Bed low and locked. Call bell within reach. Will continue monitoring. 2208: Bladder scan done =300 cc,no intervention per MD order. 0145: In bed resting quietly, no distress, sitter at the bedside. Call bell within reach. Will monitor. 1978: In bed resting quietly,no distress,with sitter on the bedside,bladder scan done  410 cc,straight cath patient,  output 400 cc slightly bloody,patient moderately tolerated. Resting now. Call bell within reach. Will continue monitoring.

## 2017-10-12 NOTE — PROGRESS NOTES
Problem: Falls - Risk of  Goal: *Absence of Falls  Document Harjinder Fall Risk and appropriate interventions in the flowsheet.    Outcome: Progressing Towards Goal  Fall Risk Interventions:  Mobility Interventions: Patient to call before getting OOB, Utilize walker, cane, or other assitive device     Mentation Interventions: Adequate sleep, hydration, pain control, Bed/chair exit alarm, Door open when patient unattended, Eyeglasses and hearing aids, Family/sitter at bedside, More frequent rounding, Reorient patient, Toileting rounds     Medication Interventions: Evaluate medications/consider consulting pharmacy, Patient to call before getting OOB, Teach patient to arise slowly     Elimination Interventions: Call light in reach, Patient to call for help with toileting needs, Toilet paper/wipes in reach, Urinal in reach     History of Falls Interventions: Consult care management for discharge planning, Door open when patient unattended, Investigate reason for fall, Evaluate medications/consider consulting pharmacy

## 2017-10-12 NOTE — PROGRESS NOTES
0730  Received pt on bed, sitter at bedside at this time still sleeping.    0900  Awake,  Responding with simple commands, fee by sitter, so far  Eating fairly and drinking, taking po med with out problem. 1200  Voided large amount of urine, incontinent  That bed was saturated, change bed and  change  gown, bladder scan was zero, since pt  daughter is to be taught for  insertion of in and out cath, after explaining to her , she wants to see the actual  catheterization, no straight cath due since no residual, but I  did  show how to to do straight cath, this time there was 10 cc urine was blood, pt daughter informed about the blood urine, then  Hospice came,  hospice RN was told, that  Pt daughter was not been taught about in and out cath yesterday, this is the first time,, per hospice RN they can teach her  while at home, sent  6  cath kit, instruction given, pt daughter is willing to do it. 1350  Discharge instruction given to pt daughter, verbalized understanding, hospice RN  Still in the room, pt sleeping, Right IJ removed pressure dressing applied. 1530  Right neck dressing intact, no bleeding been sleepy, no pain, awaiting his ride. 1600  I called medical transport, per transport there were no transport made for this pt. Called , to call transport, to be  at 1800, sitter at bedside.

## 2017-10-12 NOTE — ROUTINE PROCESS
Bedside and Verbal shift change report given to Celina Melara (oncoming nurse) by Lisa Rodas (offgoing nurse). Report included the following information SBAR, Kardex, MAR and Recent Results.

## 2017-10-12 NOTE — PROGRESS NOTES
Nutrition initial assessment/Plan of care      RECOMMENDATIONS:   1. Regular diet  2. Ensure TID  3. Monitor weight and PO intake  4. RD to follow     GOALS:   1. PO intake meets >75% of protein/calorie needs by 10/17  2. Weight Maintenance (+/- 1-2 lb) by 10/19        ASSESSMENT:   Per BMI of 26.4, weight is in the overweight classification. However, weight is appropriate vs age. PO intake is variable. Labs noted. Nutrition recommendations listed. RD to follow. Nutrition Diagnoses:   Inadequate oral intake related to AMS/dementia as evidenced by reported po intake per RN. Nutrition Risk:  [] High  [x] Moderate []  Low    SUBJECTIVE/OBJECTIVE:   Patient admitted for hematuria. Patient with h/o mild to moderate dementia. Per RN, patient is combative. Per chart review, weight was 170 lb on 3/23. Per MD, patient is doing poor. Palliative care was consulted. Information Obtained from:    [x] Chart Review   [] Patient   [] Family/Caregiver   [] Nurse/Physician   [] Interdisciplinary Meeting/Rounds    Diet: Regular  Medications: [x] Reviewed    Allergies: [x] Reviewed   Encounter Diagnoses     ICD-10-CM ICD-9-CM   1. Gross hematuria R31.0 599.71   2. Altered mental status, unspecified altered mental status type R41.82 780.97   3. Debility R53.81 799.3   4. Hematuria, unspecified type R31.9 599.70   5.  Prostate nodule N40.2 600.10     Past Medical History:   Diagnosis Date    Bladder retention     Cancer ([facility] [address]     Elevated PSA     Glaucoma     Hypertension     Macular degeneration     Mumps       Labs:  Lab Results   Component Value Date/Time    Sodium 138 10/09/2017 07:30 AM    Potassium 3.7 10/09/2017 07:30 AM    Chloride 108 10/09/2017 07:30 AM    CO2 17 10/09/2017 07:30 AM    Anion gap 13 10/09/2017 07:30 AM    Glucose 84 10/09/2017 07:30 AM    BUN 27 10/09/2017 07:30 AM    Creatinine 1.07 10/09/2017 07:30 AM    Calcium 8.5 10/09/2017 07:30 AM    Albumin 2.9 10/09/2017 07:30 AM Anthropometrics: BMI (calculated): 26.4  Last 3 Recorded Weights in this Encounter    10/06/17 0838 10/06/17 1649   Weight: 75.8 kg (167 lb 1.7 oz) 65.6 kg (144 lb 10 oz)    Ht Readings from Last 1 Encounters:   10/06/17 5' 2\" (1.575 m)     Patient Vitals for the past 100 hrs:   % Diet Eaten   10/12/17 0830 50 %   10/11/17 1241 10 %   10/10/17 1652 75 %   10/10/17 1300 0 %   10/09/17 1808 25 %   10/09/17 1330 50 %   10/09/17 0930 40 %   10/08/17 0925 0 %       IBW: 118 lb %IBW: 122%    Estimated Nutrition Needs: [x] MSJ  [] Other:  Calories: 3648-9664 kcal Based on:   [x] Actual BW    Protein:   65-80 g Based on:   [x] Actual BW    Fluid:       2980-5543 ml Based on:   [x] Actual BW      [x] No Cultural, Confucianism or ethnic dietary need identified.     [] Cultural, Confucianism and ethnic food preferences identified and addressed     Wt Status:  [] Normal (18.6 - 24.9) [] Underweight (<18.5) [x] Overweight (25 - 29.9) [] Mild Obesity (30 - 34.9)  [] Moderate Obesity (35 - 39.9) [] Morbid Obesity (40+)   [] Moderate Malnutrition [] Severe Malnutrition in the context of :     Nutrition Problems Identified:   [x] Suboptimal PO intake   [] Food Allergies  [] Difficulty chewing/swallowing/poor dentition  [] Constipation/Diarrhea   [] Nausea/Vomiting   [] None  [] Other:     Plan:   [] Therapeutic Diet  []  Obtained/adjusted food preferences/tolerances and/or snacks options   [x]  Supplements added   [] Occupational therapy following for feeding techniques  []  HS snack added   []  Modify diet texture   []  Modify diet for food allergies   []  Educate patient   []  Assist with menu selection   [x]  Monitor PO intake on meal rounds   [x]  Continue inpatient monitoring and intervention   []  Participated in discharge planning/Interdisciplinary rounds/Team meetings   []  Other:     Education Needs:   [] Not appropriate for teaching at this time due to:   [x] Identified and addressed    Nutrition Monitoring and Evaluation:  [x] Continue ongoing monitoring and intervention  [] Other    Angelica Das RD  Pager: [phone]

## 2017-10-12 NOTE — PROGRESS NOTES
Patient has required straight catheterization for bladder emptying. Patient family will need to preform this skill and will need instruction form the nursing staff. His discharge plan is to return home on hospice care.  Julius Spann RN

## 2017-10-12 NOTE — PROGRESS NOTES
Received call from yan on 2400. She states pt was supposed to be picked up at 3pm. She called [facility], and they lobato not have a trip reserved. I called [facility] spoke with [facility], she looked it up and  Their employee put in wrong date. They will pick pt up but cannot do until 6 pm . Notified, yan , asked them to call family.

## 2017-10-12 NOTE — PROGRESS NOTES
[facility]: [phone] ([phone])  [facility]: [address] [place] [address] Way: [phone]    Patient Name: Xavier Lion  YOB: 1919    Date of Initial Consult: 10-9-17  Reason for Consult: Care Decisions  Requesting Provider: DO Patrice  Primary Care Physician: Joseph Nelson MD      SUMMARY:   Xavier Lion is a 80y.o. 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 Current medical issues leading to Palliative Medicine involvement include: patient likely with either bladder cancer or prostate cancer, advances age and other co-morbidities, asked to support patient/family to establish goals of care. PALLIATIVE DIAGNOSES:   1. Hematuria  2. Confusion  3. Prostate Cancer  4. Debility        PLAN:   1. Patient seen at bedside along with Ms. Rene Gagnon NP. He was sitting up in bed, calm, told us his name was Vincent Osborn. Denied pain, did not know where he was. Sitter at bedside. 2. Hematuria- incontinent this am, sitter tells me he voided. No family at bedside but RN is aware to start teaching with family for straight caths. Will also be supported by hospice RN   3. Confusion- calm but confused this am. Danielle Mast tells me did not sleep last night, but while we were talking he fell asleep. Would continue Seroquel 12.5 mg to help with behaviors at home. Dr. Maritza Felix discussed benefits and risks with family yesterday. 4. Prostate Cancer-Dr Lizarraga reviewed the CT SCAN with family -that the lymphadenopathy represents evidence that a cancer is present and that is late stage. That given his hard prostate by exam and elevated PSA it is probably a PROSTATE CANCER but he shared that without tissue it is not confirmed.  Family voiced desire to NOT PUT HIM THROUGH painful testing that will really not change his quality nor length of his life. Bone scan was suggested by radiology but urology did not believe he could tolerate well and was cancelled. Patient will go home today with hospice services  5. Goals of care and Code status- no change DNR/DNI POST form completed yesterday with comfort measures. Plan is for home hospice later today   6. Initial consult note routed to primary continuity provider  7.  Communicated plan of care with: Palliative IDT, CM       GOALS OF CARE:     [====Goals of Care====]  GOALS OF CARE:  Patient / health care proxy stated goals: to come home with support of hospice, to be as independent as possible      TREATMENT PREFERENCES:   Code Status: DNR    Advance Care Planning:  Advance Care Planning 10/6/2017   Patient's Healthcare Decision Maker is: Legal Next of Kin   Primary Decision Maker Name Megha Kemp   Primary Decision Maker Phone Number [phone]   Primary Decision Maker Relationship to Patient Adult child   Confirm Advance Directive None   Does the patient have other document types Do Not Resuscitate;MOST/MOLST/POST/POLST       Other:    The palliative care team has discussed with patient / health care proxy about goals of care / treatment preferences for patient.  [====Goals of Care====]    Advance Care Planning 10/6/2017   Patient's Healthcare Decision Maker is: Legal Next of Marj Bajwa   Primary Decision Maker Name Megha Kemp   Primary Decision Maker Phone Number [phone]   Primary Decision Maker Relationship to Patient Adult child   Confirm Advance Directive None   Does the patient have other document types Do Not Resuscitate;MOST/MOLST/POST/POLST      dtr [facility] [phone]     HISTORY:     History obtained from: Chart  CHIEF COMPLAINT: Blood in urine and confusion    HPI/SUBJECTIVE:   10/12 98.1 56 130/72   Home with hospice later today    97.2, 68, 168/67, 20 on RA at 95%, Wt 144lb, Stool x 4 10-8, Voiding, PVR yesterday <200ml today >500ml  MAR-Tylenol prn,  Norvasc, Lumigan, Cosopt, HCTZ, Norco 5-325 prn-none rec'd, Zestril, Xalatan, Morphine IIV 1mg prn Q3h-none rec'd. (levaquin, Ativan IV 0.25mg prn -rec'd dose at 2pm 10-8) AVODART, FLOMAX  LABS-WBC 9.9, H&H 14.1 & 40.5, Plat 224, INR 1.2, BUN/Creat 25/1.21->21/0.82, Albumin 3, Urine C&S NGTD.   CT A/P- The bladder is decompressed, with Riley catheter in situ. Small amount of  intravesicular gas likely secondary to catheter placement. Apparent bladder wall  thickening and perivesicular fat stranding are nonspecific, correlation for  symptoms of urinary infection may be helpful. No evidence of hydronephrosis  involving either kidney. 2. Retroperitoneal and pelvic adenopathy, indeterminate in nature. Correlation  for history of malignancy is recommended. 3. Small hiatal hernia. Diverticulosis without evidence of diverticulitis. 4. 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 5. Calcified pleural plaques likely in keeping with prior asbestos exposure.      Consult- likely bladder mass on right lateral wall and he has extensive pelvic and RP LAD measuring up to 5 cm. Could also represent PCa  VASC 3-2017-2.7 cm abdominal aortic aneurysm as measured by x-ray performed 1 months ago. He does not have symptoms of back pain, he has occasional right LQ abdominal ain radiating to his umbilicus. CT scan performed at [facility] in 2014 shows an infrarenal aorta of 2.6 cm    The patient is:   [] Verbal and participatory  [x] Non-participatory due to: Too sleepy     Clinical Pain Assessment (nonverbal scale for nonverbal patients):     Was comfortable, denies pain       FUNCTIONAL ASSESSMENT:     Palliative Performance Scale (PPS):40%          PSYCHOSOCIAL/SPIRITUAL SCREENING:     Advance Care Planning:  Advance Care Planning 10/6/2017   Patient's Healthcare Decision Maker is: Legal Next of Kin Primary Decision Maker Name [facility]   Primary Decision Maker Phone Number [phone]   Primary Decision Maker Relationship to Patient Adult child   Confirm Advance Directive None   Does the patient have other document types Do Not Resuscitate;MOST/MOLST/POST/POLST        Any spiritual / Jewish concerns:  [] Yes /  [x] No    Caregiver Burnout:  [] Yes /  [x] No /  [] No Caregiver Present      Anticipatory grief assessment:   [x] Normal  / [] Maladaptive       ESAS Anxiety:      ESAS Depression:          REVIEW OF SYSTEMS:     Positive and pertinent negative findings in ROS are noted above in HPI. Dtr provided the ROS, ate 50% today, drinking the ensure, up OOB but not steady on feet, had BM day prior. No pain, passing urine, PVR about 500ml. Quite confused and at time agitated and irritable, even to his dtr. The following systems were [] reviewed / [x] unable to be reviewed as noted in HPI  Other findings are noted below. Systems: constitutional, ears/nose/mouth/throat, respiratory, gastrointestinal, genitourinary, musculoskeletal, integumentary, neurologic, psychiatric, endocrine. Positive findings noted below. Modified ESAS Completed by: provider                                   Stool Occurrence(s): 1        PHYSICAL EXAM:     Wt Readings from Last 3 Encounters:   10/06/17 65.6 kg (144 lb 10 oz)   10/05/17 75.8 kg (167 lb)   10/04/17 72.6 kg (160 lb)     Blood pressure 130/72, pulse (!) 56, temperature 98.1 °F (36.7 °C), resp. rate 15, height 5' 2\" (1.575 m), weight 65.6 kg (144 lb 10 oz), SpO2 95 %.   Pain:  Pain Scale 1: Numeric (0 - 10)  Pain Intensity 1: 0     Pain Location 1: Abdomen, Groin        Pain Intervention(s) 1: Medication (see MAR)  Last bowel movement: 10-8    Constitutional: elderly gentleman who looks younger then 80years old, in bed reclining, calm alert confused in NAD   Eyes: pupils equal, anicteric  Respiratory: breathing not labored, symmetric  Skin: warm, dry HISTORY:     Principal Problem:    Hematuria (10/6/2017)    Active Problems:    Altered mental status (10/9/2017)      Debility (10/9/2017)      Past Medical History:   Diagnosis Date    Bladder retention     Cancer ([facility] [address]     Elevated PSA     Glaucoma     Hypertension     Macular degeneration     Mumps       Past Surgical History:   Procedure Laterality Date    HX APPENDECTOMY  1940s    HX CATARACT REMOVAL  1990s      Family History   Problem Relation Age of Onset    Cancer Mother     Hypertension Daughter     Migraines Son      History reviewed, no pertinent family history.   Social History   Substance Use Topics    Smoking status: Former Smoker     Packs/day: 3.00     Years: 30.00     Types: Cigarettes     Quit date: 1/1/1969    Smokeless tobacco: Never Used    Alcohol use Yes     Allergies   Allergen Reactions    Aspirin Hives      Current Facility-Administered Medications   Medication Dose Route Frequency    QUEtiapine (SEROquel) tablet 12.5 mg  12.5 mg Oral QHS    lidocaine (LIDODERM) 5 % patch 1 Patch  1 Patch TransDERmal Q24H    lisinopril (PRINIVIL, ZESTRIL) tablet 20 mg  20 mg Oral DAILY    hydroCHLOROthiazide (MICROZIDE) capsule 12.5 mg  12.5 mg Oral DAILY    tamsulosin (FLOMAX) capsule 0.4 mg  0.4 mg Oral DAILY    dutasteride (AVODART) capsule 0.5 mg  0.5 mg Oral DAILY    bimatoprost (LUMIGAN) 0.01 % ophthalmic drops 1 Drop (Please use patient's own med) (Patient Supplied)  1 Drop Left Eye QPM    dorzolamide-timolol (COSOPT) 22.3-6.8 mg/mL ophthalmic solution 1 Drop (Please use patient's own med) (Patient Supplied)  1 Drop Left Eye BID    0.9% sodium chloride infusion  100 mL/hr IntraVENous CONTINUOUS    amLODIPine (NORVASC) tablet 2.5 mg  2.5 mg Oral DAILY    0.9% sodium chloride infusion  50 mL/hr IntraVENous CONTINUOUS    acetaminophen (TYLENOL) tablet 650 mg  650 mg Oral Q4H PRN    HYDROcodone-acetaminophen (NORCO) 5-325 mg per tablet 1 Tab  1 Tab Oral Q4H PRN    morphine injection 1 mg  1 mg IntraVENous Q3H PRN    ondansetron (ZOFRAN) injection 4 mg  4 mg IntraVENous Q4H PRN        LAB AND IMAGING FINDINGS:     Lab Results   Component Value Date/Time    WBC 9.9 10/09/2017 07:30 AM    HGB 14.1 10/09/2017 07:30 AM    PLATELET 173 15/06/2616 07:30 AM     Lab Results   Component Value Date/Time    Sodium 138 10/09/2017 07:30 AM    Potassium 3.7 10/09/2017 07:30 AM    Chloride 108 10/09/2017 07:30 AM    CO2 17 10/09/2017 07:30 AM    BUN 27 10/09/2017 07:30 AM    Creatinine 1.07 10/09/2017 07:30 AM    Calcium 8.5 10/09/2017 07:30 AM      Lab Results   Component Value Date/Time    AST (SGOT) 33 10/09/2017 07:30 AM    Alk. phosphatase 65 10/09/2017 07:30 AM    Protein, total 6.8 10/09/2017 07:30 AM    Albumin 2.9 10/09/2017 07:30 AM    Globulin 3.9 10/09/2017 07:30 AM     Lab Results   Component Value Date/Time    INR 1.2 10/06/2017 09:33 AM    Prothrombin time 14.4 10/06/2017 09:33 AM    aPTT 29.8 10/06/2017 09:33 AM      No results found for: IRON, FE, TIBC, IBCT, PSAT, FERR   No results found for: PH, PCO2, PO2  No components found for: GLPOC   No results found for: CPK, CKMB           Total time: 25 minutes  Counseling / coordination time, spent as noted above: 15 minutes  > 50% counseling / coordination?: yes with JON sevilla    Prolonged service was provided for  []30 min   []75 min in face to face time in the presence of the patient, spent as noted above. Time Start:  Time End:   Note: this can only be billed with 83999 (initial) or 59524 (follow up). If multiple start / stop times, list each separately.

## 2017-10-12 NOTE — PROGRESS NOTES
Urology Progress Note        Assessment/Plan:     Patient Active Problem List   Diagnosis Code    Gross hematuria R31.0    Prostate nodule N40.2    Bladder retention R33.9    Hematuria R31.9    Altered mental status R41.82    Debility R53.81         Plan:  Please see 's note for details of the family conference this morning. The patient this afternoon has started to void spontaneously. His PVRs are decreasing some and his urine does appear somewhat clearer. I discussed with Dr. Harsha Parr the need for CT of spine and he wanted a bone scan instead. I am concerned he will not be able stay still for the bone scan. We reviewed the CT from admission and noted his Lumbar spine reveals major degenerative arthritic changes and not metastatic disease pattern. I feel we should cancel bone scan in AM.  Discussed with patient's daughter. Hussein Bo MD      Subjective:     Daily Progress Note: 10/11/2017 11:07 PM    Lilian Frances is doing poor. He reports pain is poorly controlled. He has no new complaints. He is tolerating a solid diet and unable to get out of bed. Urine appearance is bloody but slightly clearer than yesterday. Objective:     Visit Vitals    /56    Pulse (!) 56  Comment: notified nurse    Temp 97.7 °F (36.5 °C)    Resp 16    Ht 5' 2\" (1.575 m)    Wt 144 lb 10 oz (65.6 kg)    SpO2 92%    BMI 26.45 kg/m2        Temp (24hrs), Av.4 °F (36.3 °C), Min:97.2 °F (36.2 °C), Max:97.7 °F (36.5 °C)      Intake and Output:  10/10 07 - 10/11 190  In: 940 [P.O.:940]  Out: 854 [Urine:854]  10/11 190 - 10/12 0700  In: -   Out: 100 [Urine:100]    PHYSICAL EXAMINATION:   Visit Vitals    /56    Pulse (!) 56  Comment: notified nurse    Temp 97.7 °F (36.5 °C)    Resp 16    Ht 5' 2\" (1.575 m)    Wt 144 lb 10 oz (65.6 kg)    SpO2 92%    BMI 26.45 kg/m2     Constitutional: Well developed, well nourished. No acute distress.     HEENT: Normocephalic, Atraumatic, EOM's intact   CV:  RRR  Respiratory: No respiratory distress or difficulties breathing   Abdomen:  Soft, non-tender, non-distended   Male:   CVA tenderness: none, lumbar back pain present. Skin: No evidence of jaundice. Normal color  Neuro/Psych:  Alert and oriented X1. Delirious. .      Labs:     Labs: Results:   Chemistry    Recent Labs      10/09/17   0730   GLU  84   NA  138   K  3.7   CL  108   CO2  17*   BUN  27*   CREA  1.07   CA  8.5   AGAP  13   BUCR  25*   AP  65   TP  6.8   ALB  2.9*   GLOB  3.9   AGRAT  0.7*      CBC w/Diff Recent Labs      10/09/17   0730   WBC  9.9   RBC  4.17*   HGB  14.1   HCT  40.5   PLT  224   GRANS  69   LYMPH  17*   EOS  2      Cultures No results for input(s): CULT in the last 72 hours.   All Micro Results     Procedure Component Value Units Date/Time    CULTURE, URINE [[id number]] Collected:  10/06/17 0949    Order Status:  Completed Specimen:  Clean catch Updated:  10/07/17 0936     Special Requests: NO SPECIAL REQUESTS        Culture result: NO GROWTH 1 DAY               Urinalysis Color   Date Value Ref Range Status   10/06/2017 YELLOW   Final     Appearance   Date Value Ref Range Status   10/06/2017 BLOODY   Final     Specific gravity   Date Value Ref Range Status   10/06/2017 1.028 1.005 - 1.030   Final     pH (UA)   Date Value Ref Range Status   10/06/2017 5.5 5.0 - 8.0   Final     Protein   Date Value Ref Range Status   10/06/2017 >1000 (A) NEG mg/dL Final     Ketone   Date Value Ref Range Status   10/06/2017 15 (A) NEG mg/dL Final     Bilirubin   Date Value Ref Range Status   10/06/2017 NEGATIVE  NEG   Final     Blood   Date Value Ref Range Status   10/06/2017 LARGE (A) NEG   Final     Urobilinogen   Date Value Ref Range Status   10/06/2017 1.0 0.2 - 1.0 EU/dL Final     Nitrites   Date Value Ref Range Status   10/06/2017 NEGATIVE  NEG   Final     Leukocyte Esterase   Date Value Ref Range Status   10/06/2017 MODERATE (A) NEG   Final     Potassium   Date Value Ref Range Status   10/09/2017 3.7 3.5 - 5.5 mmol/L Final     Creatinine   Date Value Ref Range Status   10/09/2017 1.07 0.6 - 1.3 MG/DL Final     BUN   Date Value Ref Range Status   10/09/2017 27 (H) 7.0 - 18 MG/DL Final     Prostate Specific Ag   Date Value Ref Range Status   10/06/2017 231.0 (H) 0.0 - 4.0 ng/mL Final     Comment:     New method in use, please reestablish patient baseline      PSA No results for input(s): PSA in the last 72 hours.    Coagulation Lab Results   Component Value Date/Time    Prothrombin time 14.4 10/06/2017 09:33 AM    Prothrombin time 15.0 10/04/2017 05:00 PM    INR 1.2 10/06/2017 09:33 AM    INR 1.2 10/04/2017 05:00 PM    aPTT 29.8 10/06/2017 09:33 AM    aPTT 30.3 10/04/2017 05:00 PM         Maranda Merchant MD

## 2017-10-12 NOTE — PROGRESS NOTES
Care Management Interventions  PCP Verified by CM:  Yes  Palliative Care Criteria Met (RRAT>21 & CHF Dx)?: No  Transition of Care Consult (CM Consult): Ernie: Yes  Discharge Durable Medical Equipment: No  Physical Therapy Consult: No  Occupational Therapy Consult: No  Speech Therapy Consult: No  Current Support Network: [address] [place] [address] discussed with Pt/Family/Caregiver: Yes  Freedom of Choice Offered: Yes  Discharge Location  Discharge Placement: Home with hospice

## 2017-10-12 NOTE — DISCHARGE SUMMARY
[facility] [place]    Discharge Summary    Patient: Winsome Gloria MRN: [id number]  CSN: [id number]    YOB: 1919  Age: 80 y.o. Sex: male    DOA: 10/6/2017 LOS:  LOS: 6 days   Discharge Date:      Admission Diagnoses: Hematuria    Discharge Diagnoses:    Problem List as of 10/12/2017  Date Reviewed: 3/30/2012          Codes Class Noted - Resolved    Altered mental status ICD-10-CM: R41.82  ICD-9-CM: 780.97  10/9/2017 - Present        Debility ICD-10-CM: R53.81  ICD-9-CM: 799.3  10/9/2017 - Present        * (Principal)Hematuria ICD-10-CM: R31.9  ICD-9-CM: 599.70  10/6/2017 - Present        Gross hematuria ICD-10-CM: R31.0  ICD-9-CM: 599.71  2/13/2012 - Present        Prostate nodule ICD-10-CM: N40.2  ICD-9-CM: 600.10  2/13/2012 - Present        Bladder retention ICD-10-CM: R33.9  ICD-9-CM: 788.20  Unknown - Present        RESOLVED: [facility] ICD-10-CM: [place]  ICD-9-CM: 024  10/9/2017 - 10/9/2017              Discharge Condition: [address]    Discharge To: Home    Consults: Hospitalist and Urology    Hospital Course: 80 y.o.  male with HTN, smoking history, COPD and recent urinary issues who was admitted [facility], gross hematuria and clots. This was his third ER visit  for hematuria however no prior issues. Annabel Keyes had a saavedra in place and apparently pulled on it due to confusion which caused some more bleeding. Urology consulted. A 26 Fr 3 way saavedra was placed in the ER and CBI was started. Annabel Keyes has a h/o of bladder tumor that was resected by Dr. Thien Choi several years (2010) but pathology was benign, showed chronic inflammation.  His daughter reported he has had a poor appetite for months, comments that he has lost weight over the last 6 months but unsure how much.  CT scan showed a bladder mass on right lateral wall with extensive pelvic and retroperitoneal LAD measuring up to 5 cm. His urine returned to clear and his CBI stopped and saavedra removed over the weekend however he has had intermittent delerium. He received intermittent straight caths with hematuria and blood clots. He was evaluated by urology who recommended bladder scan with straight cath every 6-8 hours. Pt also on Avodart and started on Flomax. Decisions for TURBT vs IR biopsy of LAD held due to age and extent of disease likely poor and treatments may be limited. Palliative care met with family who decided to bring the patient home on hospice. He is stable for discharge with hospice. Physical Exam:  General appearance: alert, cooperative, no distress, appears stated age  Head: Normocephalic, without obvious abnormality, atraumatic  Lungs: clear to auscultation bilaterally  Heart: regular rate and rhythm, S1, S2 normal, no murmur, click, rub or gallop  Abdomen: soft, mild suprapubic tenderness on light palpation. Bowel sounds normal. No masses,  no organomegaly  Extremities: extremities normal, atraumatic, no cyanosis or edema  Skin: Skin color, texture, turgor normal. No rashes or lesions  Neurologic: Grossly normal  PSY:calm at present    Significant Diagnostic Studies: labs: Lab results reviewed. For significant abnormal values and values requiring intervention, see assessment and plan. CT abdomen/pelvis with contrast 10/4/2017  IMPRESSION:     1. The bladder is decompressed, with Riley catheter in situ. Small amount of  intravesicular gas likely secondary to catheter placement. Apparent bladder wall  thickening and perivesicular fat stranding are nonspecific, correlation for  symptoms of urinary infection may be helpful. No evidence of hydronephrosis  involving either kidney. 2. Retroperitoneal and pelvic adenopathy, indeterminate in nature. Correlation  for history of malignancy is recommended. 3. Small hiatal hernia. Diverticulosis without evidence of diverticulitis. 4. 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   5. Calcified pleural plaques likely in keeping with prior asbestos exposure.     Discharge Medications:     Current Discharge Medication List      START taking these medications    Details   dutasteride (AVODART) 0.5 mg capsule Take 1 Cap by mouth daily. Qty: 30 Cap, Refills: 0      tamsulosin (FLOMAX) 0.4 mg capsule Take 1 Cap by mouth daily. Qty: 30 Cap, Refills: 0      HYDROcodone-acetaminophen (NORCO) 5-325 mg per tablet Take 1 Tab by mouth every four (4) hours as needed. Max Daily Amount: 6 Tabs. Qty: 42 Tab, Refills: 0      lidocaine (LIDODERM) 5 % Apply patch to the affected area for 12 hours a day and remove for 12 hours a day. Qty: 7 Each, Refills: 0      QUEtiapine (SEROQUEL) 25 mg tablet Take 0.5 Tabs by mouth nightly. Qty: 30 Tab, Refills: 0      ondansetron (ZOFRAN ODT) 4 mg disintegrating tablet Take 1 Tab by mouth every eight (8) hours as needed for Nausea. Qty: 42 Tab, Refills: 0         CONTINUE these medications which have NOT CHANGED    Details   timolol maleate 0.5 % drpd ophthalmic solution Administer 1 Drop to left eye two (2) times a day. bimatoprost (LUMIGAN) 0.01 % ophthalmic drops Administer 1 Drop to left eye every evening. amLODIPine (NORVASC) 2.5 mg tablet Take  by mouth daily. lisinopril-hydrochlorothiazide (PRINZIDE) 20-12.5 mg per tablet Take  by mouth daily.            STOP taking these medications       bimatoprost (LUMIGAN) 0.03 % ophthalmic drops Comments:   Reason for Stopping:         cephALEXin (KEFLEX) 500 mg capsule Comments:   Reason for Stopping:               Activity: Activity as tolerated    Diet: Regular Diet w/ Ensure nutritional supplements    Wound Care: None needed    Follow-up: F/u with hospice     Discharge time: >35 mins  Janie Young NP  10/12/2017, 11:36 AM

## 2017-10-12 NOTE — PROGRESS NOTES
Problem: Falls - Risk of  Goal: *Absence of Falls  Document Harjinder Fall Risk and appropriate interventions in the flowsheet.    Outcome: Progressing Towards Goal  Fall Risk Interventions:  Mobility Interventions: Patient to call before getting OOB     Mentation Interventions: Family/sitter at bedside     Medication Interventions: Bed/chair exit alarm, Patient to call before getting OOB     Elimination Interventions: Call light in reach, Patient to call for help with toileting needs, Toilet paper/wipes in reach, Urinal in reach     History of Falls Interventions: Consult care management for discharge planning, Door open when patient unattended, Investigate reason for fall, Evaluate medications/consider consulting pharmacy

## 2017-10-12 NOTE — PROGRESS NOTES
Spoke to NP Ava Olivo, she stated the patient is ready for discharge. [facility] transport arranged for 300 pm  time. PCS form and check list to nurses station, POST form included. Family to be taught straight cath procedure today at noon.  Daniela Osorio RN

## 2017-10-21 PROCEDURE — 0651 HSPC ROUTINE HOME CARE

## 2017-10-22 PROCEDURE — 0651 HSPC ROUTINE HOME CARE

## 2017-10-23 VITALS
TEMPERATURE: 98.2 F | HEART RATE: 87 BPM | RESPIRATION RATE: 20 BRPM | DIASTOLIC BLOOD PRESSURE: 48 MMHG | SYSTOLIC BLOOD PRESSURE: 82 MMHG

## 2017-10-23 PROCEDURE — 0651 HSPC ROUTINE HOME CARE

## 2017-10-24 VITALS
TEMPERATURE: 97.6 F | RESPIRATION RATE: 20 BRPM | OXYGEN SATURATION: 88 % | SYSTOLIC BLOOD PRESSURE: 128 MMHG | DIASTOLIC BLOOD PRESSURE: 60 MMHG | HEART RATE: 76 BPM

## 2017-10-24 PROCEDURE — 0651 HSPC ROUTINE HOME CARE

## 2017-10-25 PROCEDURE — 0651 HSPC ROUTINE HOME CARE

## 2017-10-26 PROCEDURE — 0651 HSPC ROUTINE HOME CARE

## 2017-10-27 PROCEDURE — 0651 HSPC ROUTINE HOME CARE

## 2017-10-28 PROCEDURE — 0651 HSPC ROUTINE HOME CARE

## 2017-10-29 PROCEDURE — 0651 HSPC ROUTINE HOME CARE
